# Patient Record
Sex: MALE | Race: WHITE
[De-identification: names, ages, dates, MRNs, and addresses within clinical notes are randomized per-mention and may not be internally consistent; named-entity substitution may affect disease eponyms.]

---

## 2018-05-12 ENCOUNTER — HOSPITAL ENCOUNTER (EMERGENCY)
Dept: HOSPITAL 94 - ER | Age: 58
Discharge: HOME | End: 2018-05-12
Payer: COMMERCIAL

## 2018-05-12 VITALS — HEIGHT: 65 IN | BODY MASS INDEX: 24.61 KG/M2 | WEIGHT: 147.71 LBS

## 2018-05-12 VITALS — SYSTOLIC BLOOD PRESSURE: 147 MMHG | DIASTOLIC BLOOD PRESSURE: 78 MMHG

## 2018-05-12 DIAGNOSIS — I13.0: Primary | ICD-10-CM

## 2018-05-12 DIAGNOSIS — Z79.899: ICD-10-CM

## 2018-05-12 DIAGNOSIS — E87.5: ICD-10-CM

## 2018-05-12 DIAGNOSIS — N18.9: ICD-10-CM

## 2018-05-12 DIAGNOSIS — I50.9: ICD-10-CM

## 2018-05-12 DIAGNOSIS — E78.00: ICD-10-CM

## 2018-05-12 DIAGNOSIS — Z90.49: ICD-10-CM

## 2018-05-12 DIAGNOSIS — Z87.891: ICD-10-CM

## 2018-05-12 DIAGNOSIS — E03.9: ICD-10-CM

## 2018-05-12 DIAGNOSIS — F15.10: ICD-10-CM

## 2018-05-12 DIAGNOSIS — G89.29: ICD-10-CM

## 2018-05-12 DIAGNOSIS — E11.22: ICD-10-CM

## 2018-05-12 DIAGNOSIS — F12.10: ICD-10-CM

## 2018-05-12 DIAGNOSIS — K21.9: ICD-10-CM

## 2018-05-12 DIAGNOSIS — Z79.82: ICD-10-CM

## 2018-05-12 LAB
ALBUMIN SERPL BCP-MCNC: 2.5 G/DL (ref 3.4–5)
ALBUMIN/GLOB SERPL: 0.7 {RATIO} (ref 1.1–1.5)
ALP SERPL-CCNC: 115 IU/L (ref 46–116)
ALT SERPL W P-5'-P-CCNC: 38 U/L (ref 12–78)
ANION GAP SERPL CALCULATED.3IONS-SCNC: 8 MMOL/L (ref 8–16)
AST SERPL W P-5'-P-CCNC: 27 U/L (ref 10–37)
BASOPHILS # BLD AUTO: 0 X10'3 (ref 0–0.2)
BASOPHILS NFR BLD AUTO: 0.6 % (ref 0–1)
BILIRUB SERPL-MCNC: 0.1 MG/DL (ref 0.1–1)
BUN SERPL-MCNC: 89 MG/DL (ref 7–18)
BUN/CREAT SERPL: 30.8 (ref 5.4–32)
CALCIUM SERPL-MCNC: 8 MG/DL (ref 8.5–10.1)
CHLORIDE SERPL-SCNC: 108 MMOL/L (ref 99–107)
CO2 SERPL-SCNC: 21.9 MMOL/L (ref 24–32)
CREAT SERPL-MCNC: 2.89 MG/DL (ref 0.6–1.1)
EOSINOPHIL # BLD AUTO: 0.2 X10'3 (ref 0–0.9)
EOSINOPHIL NFR BLD AUTO: 2.5 % (ref 0–6)
ERYTHROCYTE [DISTWIDTH] IN BLOOD BY AUTOMATED COUNT: 14.4 % (ref 11.5–14.5)
GFR SERPL CREATININE-BSD FRML MDRD: 23 ML/MIN
GLUCOSE SERPL-MCNC: 132 MG/DL (ref 70–104)
HCT VFR BLD AUTO: 26.5 % (ref 42–52)
HGB BLD-MCNC: 8.7 G/DL (ref 14–17.9)
LYMPHOCYTES # BLD AUTO: 1.2 X10'3 (ref 1.1–4.8)
LYMPHOCYTES NFR BLD AUTO: 19.2 % (ref 21–51)
MCH RBC QN AUTO: 30.5 PG (ref 27–31)
MCHC RBC AUTO-ENTMCNC: 32.8 % (ref 33–36.5)
MCV RBC AUTO: 92.9 FL (ref 78–98)
MONOCYTES # BLD AUTO: 0.5 X10'3 (ref 0–0.9)
MONOCYTES NFR BLD AUTO: 8.2 % (ref 2–12)
NEUTROPHILS # BLD AUTO: 4.2 X10'3 (ref 1.8–7.7)
NEUTROPHILS NFR BLD AUTO: 69.5 % (ref 42–75)
PLATELET # BLD AUTO: 241 X10'3 (ref 140–440)
PMV BLD AUTO: 7.1 FL (ref 7.4–10.4)
POTASSIUM SERPL-SCNC: 5.7 MMOL/L (ref 3.5–5.1)
PROT SERPL-MCNC: 6.1 G/DL (ref 6.4–8.2)
RBC # BLD AUTO: 2.86 X10'6 (ref 4.7–6.1)
SODIUM SERPL-SCNC: 138 MMOL/L (ref 135–145)
WBC # BLD AUTO: 6.1 X10'3 (ref 4.5–11)

## 2018-05-12 PROCEDURE — 99285 EMERGENCY DEPT VISIT HI MDM: CPT

## 2018-05-12 PROCEDURE — 85025 COMPLETE CBC W/AUTO DIFF WBC: CPT

## 2018-05-12 PROCEDURE — 80053 COMPREHEN METABOLIC PANEL: CPT

## 2018-05-12 PROCEDURE — 93005 ELECTROCARDIOGRAM TRACING: CPT

## 2018-05-12 PROCEDURE — 36415 COLL VENOUS BLD VENIPUNCTURE: CPT

## 2018-07-10 ENCOUNTER — HOSPITAL ENCOUNTER (EMERGENCY)
Dept: HOSPITAL 94 - ER | Age: 58
LOS: 1 days | Discharge: HOME | End: 2018-07-11
Payer: COMMERCIAL

## 2018-07-10 VITALS — WEIGHT: 145.51 LBS | HEIGHT: 66 IN | BODY MASS INDEX: 23.38 KG/M2

## 2018-07-10 DIAGNOSIS — F12.90: ICD-10-CM

## 2018-07-10 DIAGNOSIS — E87.5: Primary | ICD-10-CM

## 2018-07-10 DIAGNOSIS — E11.42: ICD-10-CM

## 2018-07-10 DIAGNOSIS — E78.00: ICD-10-CM

## 2018-07-10 DIAGNOSIS — I13.0: ICD-10-CM

## 2018-07-10 DIAGNOSIS — N18.9: ICD-10-CM

## 2018-07-10 DIAGNOSIS — Z79.82: ICD-10-CM

## 2018-07-10 DIAGNOSIS — I50.9: ICD-10-CM

## 2018-07-10 DIAGNOSIS — E03.9: ICD-10-CM

## 2018-07-10 DIAGNOSIS — F15.90: ICD-10-CM

## 2018-07-10 DIAGNOSIS — Z79.899: ICD-10-CM

## 2018-07-10 DIAGNOSIS — E11.22: ICD-10-CM

## 2018-07-10 DIAGNOSIS — Z90.49: ICD-10-CM

## 2018-07-10 DIAGNOSIS — K21.9: ICD-10-CM

## 2018-07-10 PROCEDURE — 85025 COMPLETE CBC W/AUTO DIFF WBC: CPT

## 2018-07-10 PROCEDURE — 85610 PROTHROMBIN TIME: CPT

## 2018-07-10 PROCEDURE — 36415 COLL VENOUS BLD VENIPUNCTURE: CPT

## 2018-07-10 PROCEDURE — 80053 COMPREHEN METABOLIC PANEL: CPT

## 2018-07-10 PROCEDURE — 85730 THROMBOPLASTIN TIME PARTIAL: CPT

## 2018-07-10 PROCEDURE — 99284 EMERGENCY DEPT VISIT MOD MDM: CPT

## 2018-07-10 PROCEDURE — 83735 ASSAY OF MAGNESIUM: CPT

## 2018-07-10 PROCEDURE — 84100 ASSAY OF PHOSPHORUS: CPT

## 2018-07-10 PROCEDURE — 80048 BASIC METABOLIC PNL TOTAL CA: CPT

## 2018-07-11 VITALS — DIASTOLIC BLOOD PRESSURE: 90 MMHG | SYSTOLIC BLOOD PRESSURE: 188 MMHG

## 2018-07-11 LAB
ALBUMIN SERPL BCP-MCNC: 3.2 G/DL (ref 3.4–5)
ALBUMIN SERPL BCP-MCNC: 3.2 G/DL (ref 3.4–5)
ALBUMIN/GLOB SERPL: 0.8 {RATIO} (ref 1.1–1.5)
ALP SERPL-CCNC: 138 IU/L (ref 46–116)
ALT SERPL W P-5'-P-CCNC: 57 U/L (ref 12–78)
ANION GAP SERPL CALCULATED.3IONS-SCNC: 11 MMOL/L (ref 8–16)
ANION GAP SERPL CALCULATED.3IONS-SCNC: 14 MMOL/L (ref 8–16)
APTT PPP: 23 SECONDS (ref 22–32)
AST SERPL W P-5'-P-CCNC: 30 U/L (ref 10–37)
BASOPHILS # BLD AUTO: 0 X10'3 (ref 0–0.2)
BASOPHILS NFR BLD AUTO: 0.5 % (ref 0–1)
BILIRUB SERPL-MCNC: 0.1 MG/DL (ref 0.1–1)
BUN SERPL-MCNC: 92 MG/DL (ref 7–18)
BUN SERPL-MCNC: 96 MG/DL (ref 7–18)
BUN/CREAT SERPL: 28.2 (ref 5.4–32)
BUN/CREAT SERPL: 29.4 (ref 5.4–32)
CALCIUM SERPL-MCNC: 8.4 MG/DL (ref 8.5–10.1)
CALCIUM SERPL-MCNC: 8.4 MG/DL (ref 8.5–10.1)
CHLORIDE SERPL-SCNC: 106 MMOL/L (ref 99–107)
CHLORIDE SERPL-SCNC: 106 MMOL/L (ref 99–107)
CO2 SERPL-SCNC: 18.5 MMOL/L (ref 24–32)
CO2 SERPL-SCNC: 20 MMOL/L (ref 24–32)
CREAT SERPL-MCNC: 3.13 MG/DL (ref 0.6–1.1)
CREAT SERPL-MCNC: 3.4 MG/DL (ref 0.6–1.1)
EOSINOPHIL # BLD AUTO: 0.2 X10'3 (ref 0–0.9)
EOSINOPHIL NFR BLD AUTO: 2.7 % (ref 0–6)
ERYTHROCYTE [DISTWIDTH] IN BLOOD BY AUTOMATED COUNT: 13.3 % (ref 11.5–14.5)
GFR SERPL CREATININE-BSD FRML MDRD: 19 ML/MIN
GFR SERPL CREATININE-BSD FRML MDRD: 21 ML/MIN
GLUCOSE SERPL-MCNC: 119 MG/DL (ref 70–104)
GLUCOSE SERPL-MCNC: 138 MG/DL (ref 70–104)
HCT VFR BLD AUTO: 25.9 % (ref 42–52)
HGB BLD-MCNC: 8.7 G/DL (ref 14–17.9)
INR PPP: 0.9 INR
LYMPHOCYTES # BLD AUTO: 1.3 X10'3 (ref 1.1–4.8)
LYMPHOCYTES NFR BLD AUTO: 16 % (ref 21–51)
MAGNESIUM SERPL-MCNC: 2.9 MG/DL (ref 1.5–2.4)
MCH RBC QN AUTO: 30.9 PG (ref 27–31)
MCHC RBC AUTO-ENTMCNC: 33.7 % (ref 33–36.5)
MCV RBC AUTO: 91.7 FL (ref 78–98)
MONOCYTES # BLD AUTO: 0.7 X10'3 (ref 0–0.9)
MONOCYTES NFR BLD AUTO: 8.9 % (ref 2–12)
NEUTROPHILS # BLD AUTO: 5.6 X10'3 (ref 1.8–7.7)
NEUTROPHILS NFR BLD AUTO: 71.9 % (ref 42–75)
PHOSPHATE SERPL-MCNC: 7 MG/DL (ref 2.3–4.5)
PLATELET # BLD AUTO: 228 X10'3 (ref 140–440)
PMV BLD AUTO: 7.2 FL (ref 7.4–10.4)
POTASSIUM SERPL-SCNC: 5.3 MMOL/L (ref 3.5–5.1)
POTASSIUM SERPL-SCNC: 5.6 MMOL/L (ref 3.5–5.1)
PROT SERPL-MCNC: 7 G/DL (ref 6.4–8.2)
PROTHROMBIN TIME: 9.3 SECONDS (ref 9–12)
RBC # BLD AUTO: 2.83 X10'6 (ref 4.7–6.1)
SODIUM SERPL-SCNC: 137 MMOL/L (ref 135–145)
SODIUM SERPL-SCNC: 138 MMOL/L (ref 135–145)
WBC # BLD AUTO: 7.8 X10'3 (ref 4.5–11)

## 2018-10-11 ENCOUNTER — HOSPITAL ENCOUNTER (INPATIENT)
Dept: HOSPITAL 94 - ER | Age: 58
LOS: 6 days | Discharge: HOME | DRG: 673 | End: 2018-10-17
Attending: INTERNAL MEDICINE | Admitting: FAMILY MEDICINE
Payer: COMMERCIAL

## 2018-10-11 VITALS — DIASTOLIC BLOOD PRESSURE: 48 MMHG | SYSTOLIC BLOOD PRESSURE: 149 MMHG

## 2018-10-11 VITALS — BODY MASS INDEX: 24.77 KG/M2 | WEIGHT: 154.1 LBS | HEIGHT: 66 IN

## 2018-10-11 VITALS — SYSTOLIC BLOOD PRESSURE: 161 MMHG | DIASTOLIC BLOOD PRESSURE: 67 MMHG

## 2018-10-11 VITALS — DIASTOLIC BLOOD PRESSURE: 67 MMHG | SYSTOLIC BLOOD PRESSURE: 158 MMHG

## 2018-10-11 DIAGNOSIS — F12.90: ICD-10-CM

## 2018-10-11 DIAGNOSIS — E78.5: ICD-10-CM

## 2018-10-11 DIAGNOSIS — E11.43: ICD-10-CM

## 2018-10-11 DIAGNOSIS — E87.5: ICD-10-CM

## 2018-10-11 DIAGNOSIS — E11.22: ICD-10-CM

## 2018-10-11 DIAGNOSIS — E43: ICD-10-CM

## 2018-10-11 DIAGNOSIS — Z82.3: ICD-10-CM

## 2018-10-11 DIAGNOSIS — D63.1: ICD-10-CM

## 2018-10-11 DIAGNOSIS — Z79.4: ICD-10-CM

## 2018-10-11 DIAGNOSIS — G89.29: ICD-10-CM

## 2018-10-11 DIAGNOSIS — F32.9: ICD-10-CM

## 2018-10-11 DIAGNOSIS — Z83.3: ICD-10-CM

## 2018-10-11 DIAGNOSIS — E87.2: ICD-10-CM

## 2018-10-11 DIAGNOSIS — K31.84: ICD-10-CM

## 2018-10-11 DIAGNOSIS — E11.00: ICD-10-CM

## 2018-10-11 DIAGNOSIS — I13.2: ICD-10-CM

## 2018-10-11 DIAGNOSIS — Z79.82: ICD-10-CM

## 2018-10-11 DIAGNOSIS — E03.9: ICD-10-CM

## 2018-10-11 DIAGNOSIS — E87.0: ICD-10-CM

## 2018-10-11 DIAGNOSIS — Z82.49: ICD-10-CM

## 2018-10-11 DIAGNOSIS — Z79.899: ICD-10-CM

## 2018-10-11 DIAGNOSIS — N17.9: Primary | ICD-10-CM

## 2018-10-11 DIAGNOSIS — Z90.49: ICD-10-CM

## 2018-10-11 DIAGNOSIS — K21.9: ICD-10-CM

## 2018-10-11 DIAGNOSIS — I48.91: ICD-10-CM

## 2018-10-11 DIAGNOSIS — D72.829: ICD-10-CM

## 2018-10-11 DIAGNOSIS — E11.319: ICD-10-CM

## 2018-10-11 DIAGNOSIS — N18.6: ICD-10-CM

## 2018-10-11 DIAGNOSIS — Z23: ICD-10-CM

## 2018-10-11 DIAGNOSIS — E78.00: ICD-10-CM

## 2018-10-11 DIAGNOSIS — I50.9: ICD-10-CM

## 2018-10-11 LAB
ALBUMIN SERPL BCP-MCNC: 3.4 G/DL (ref 3.4–5)
ALBUMIN SERPL BCP-MCNC: 3.5 G/DL (ref 3.4–5)
ALBUMIN SERPL BCP-MCNC: 3.7 G/DL (ref 3.4–5)
ALBUMIN/GLOB SERPL: 0.9 {RATIO} (ref 1.1–1.5)
ALP SERPL-CCNC: 83 IU/L (ref 46–116)
ALT SERPL W P-5'-P-CCNC: 36 U/L (ref 12–78)
AMORPH URATE CRY #/AREA URNS HPF: (no result) /[HPF]
ANION GAP SERPL CALCULATED.3IONS-SCNC: 22 MMOL/L (ref 8–16)
ANION GAP SERPL CALCULATED.3IONS-SCNC: 24 MMOL/L (ref 8–16)
ANION GAP SERPL CALCULATED.3IONS-SCNC: 24 MMOL/L (ref 8–16)
AST SERPL W P-5'-P-CCNC: 25 U/L (ref 10–37)
BACTERIA URNS QL MICRO: (no result) /HPF
BASOPHILS # BLD AUTO: 0 X10'3 (ref 0–0.2)
BASOPHILS NFR BLD AUTO: 0.2 % (ref 0–1)
BILIRUB SERPL-MCNC: 0.3 MG/DL (ref 0.1–1)
BUN SERPL-MCNC: 104 MG/DL (ref 7–18)
BUN SERPL-MCNC: 110 MG/DL (ref 7–18)
BUN SERPL-MCNC: 99 MG/DL (ref 7–18)
BUN/CREAT SERPL: 22.3 (ref 5.4–32)
BUN/CREAT SERPL: 23 (ref 5.4–32)
BUN/CREAT SERPL: 23.4 (ref 5.4–32)
CALCIUM SERPL-MCNC: 8.6 MG/DL (ref 8.5–10.1)
CALCIUM SERPL-MCNC: 8.8 MG/DL (ref 8.5–10.1)
CALCIUM SERPL-MCNC: 9 MG/DL (ref 8.5–10.1)
CHLORIDE SERPL-SCNC: 107 MMOL/L (ref 99–107)
CHLORIDE SERPL-SCNC: 110 MMOL/L (ref 99–107)
CHLORIDE SERPL-SCNC: 112 MMOL/L (ref 99–107)
CLARITY UR: (no result)
CO2 SERPL-SCNC: 10.1 MMOL/L (ref 24–32)
CO2 SERPL-SCNC: 11.1 MMOL/L (ref 24–32)
CO2 SERPL-SCNC: 13.4 MMOL/L (ref 24–32)
COLOR UR: YELLOW
CREAT SERPL-MCNC: 4.43 MG/DL (ref 0.6–1.1)
CREAT SERPL-MCNC: 4.44 MG/DL (ref 0.6–1.1)
CREAT SERPL-MCNC: 4.78 MG/DL (ref 0.6–1.1)
DEPRECATED SQUAMOUS URNS QL MICRO: (no result) /LPF
EOSINOPHIL # BLD AUTO: 0.1 X10'3 (ref 0–0.9)
EOSINOPHIL NFR BLD AUTO: 0.5 % (ref 0–6)
ERYTHROCYTE [DISTWIDTH] IN BLOOD BY AUTOMATED COUNT: 13.2 % (ref 11.5–14.5)
GFR SERPL CREATININE-BSD FRML MDRD: 13 ML/MIN
GFR SERPL CREATININE-BSD FRML MDRD: 14 ML/MIN
GFR SERPL CREATININE-BSD FRML MDRD: 14 ML/MIN
GLUCOSE SERPL-MCNC: 252 MG/DL (ref 70–104)
GLUCOSE SERPL-MCNC: 266 MG/DL (ref 70–104)
GLUCOSE SERPL-MCNC: 299 MG/DL (ref 70–104)
GLUCOSE UR STRIP-MCNC: 250 MG/DL
HCT VFR BLD AUTO: 26.8 % (ref 42–52)
HGB BLD-MCNC: 9.1 G/DL (ref 14–17.9)
HGB UR QL STRIP: (no result)
INR PPP: 1 INR
KETONES UR STRIP-MCNC: NEGATIVE MG/DL
LEUKOCYTE ESTERASE UR QL STRIP: NEGATIVE
LIPASE SERPL-CCNC: 404 U/L (ref 73–393)
LYMPHOCYTES # BLD AUTO: 1.5 X10'3 (ref 1.1–4.8)
LYMPHOCYTES NFR BLD AUTO: 9 % (ref 21–51)
MCH RBC QN AUTO: 30.7 PG (ref 27–31)
MCHC RBC AUTO-ENTMCNC: 33.7 % (ref 33–36.5)
MCV RBC AUTO: 91.1 FL (ref 78–98)
MONOCYTES # BLD AUTO: 0.5 X10'3 (ref 0–0.9)
MONOCYTES NFR BLD AUTO: 3.1 % (ref 2–12)
NEUTROPHILS # BLD AUTO: 14.2 X10'3 (ref 1.8–7.7)
NEUTROPHILS NFR BLD AUTO: 87.2 % (ref 42–75)
NITRITE UR QL STRIP: NEGATIVE
PH UR STRIP: 5.5 [PH] (ref 4.8–8)
PHOSPHATE SERPL-MCNC: 7 MG/DL (ref 2.3–4.5)
PHOSPHATE SERPL-MCNC: 7.3 MG/DL (ref 2.3–4.5)
PLATELET # BLD AUTO: 277 X10'3 (ref 140–440)
PMV BLD AUTO: 7.3 FL (ref 7.4–10.4)
POTASSIUM SERPL-SCNC: 4.4 MMOL/L (ref 3.5–5.1)
POTASSIUM SERPL-SCNC: 4.7 MMOL/L (ref 3.5–5.1)
POTASSIUM SERPL-SCNC: 4.9 MMOL/L (ref 3.5–5.1)
PROT SERPL-MCNC: 7.7 G/DL (ref 6.4–8.2)
PROT UR QL STRIP: 100 MG/DL
PROTHROMBIN TIME: 9.9 SECONDS (ref 9–12)
RBC # BLD AUTO: 2.95 X10'6 (ref 4.7–6.1)
RBC #/AREA URNS HPF: (no result) /HPF (ref 0–2)
SODIUM SERPL-SCNC: 142 MMOL/L (ref 135–145)
SODIUM SERPL-SCNC: 144 MMOL/L (ref 135–145)
SODIUM SERPL-SCNC: 147 MMOL/L (ref 135–145)
SP GR UR STRIP: 1.02 (ref 1–1.03)
URN COLLECT METHOD CLASS: (no result)
UROBILINOGEN UR STRIP-MCNC: 0.2 E.U/DL (ref 0.2–1)
WBC # BLD AUTO: 16.3 X10'3 (ref 4.5–11)
WBC #/AREA URNS HPF: (no result) /HPF (ref 0–4)

## 2018-10-11 PROCEDURE — 97530 THERAPEUTIC ACTIVITIES: CPT

## 2018-10-11 PROCEDURE — 96375 TX/PRO/DX INJ NEW DRUG ADDON: CPT

## 2018-10-11 PROCEDURE — 83036 HEMOGLOBIN GLYCOSYLATED A1C: CPT

## 2018-10-11 PROCEDURE — 85730 THROMBOPLASTIN TIME PARTIAL: CPT

## 2018-10-11 PROCEDURE — 82728 ASSAY OF FERRITIN: CPT

## 2018-10-11 PROCEDURE — 85610 PROTHROMBIN TIME: CPT

## 2018-10-11 PROCEDURE — 93005 ELECTROCARDIOGRAM TRACING: CPT

## 2018-10-11 PROCEDURE — 82948 REAGENT STRIP/BLOOD GLUCOSE: CPT

## 2018-10-11 PROCEDURE — 83690 ASSAY OF LIPASE: CPT

## 2018-10-11 PROCEDURE — 83540 ASSAY OF IRON: CPT

## 2018-10-11 PROCEDURE — 36558 INSERT TUNNELED CV CATH: CPT

## 2018-10-11 PROCEDURE — 85025 COMPLETE CBC W/AUTO DIFF WBC: CPT

## 2018-10-11 PROCEDURE — 96376 TX/PRO/DX INJ SAME DRUG ADON: CPT

## 2018-10-11 PROCEDURE — 84443 ASSAY THYROID STIM HORMONE: CPT

## 2018-10-11 PROCEDURE — 97161 PT EVAL LOW COMPLEX 20 MIN: CPT

## 2018-10-11 PROCEDURE — 80053 COMPREHEN METABOLIC PANEL: CPT

## 2018-10-11 PROCEDURE — 97116 GAIT TRAINING THERAPY: CPT

## 2018-10-11 PROCEDURE — 93306 TTE W/DOPPLER COMPLETE: CPT

## 2018-10-11 PROCEDURE — 87070 CULTURE OTHR SPECIMN AEROBIC: CPT

## 2018-10-11 PROCEDURE — 83735 ASSAY OF MAGNESIUM: CPT

## 2018-10-11 PROCEDURE — 71045 X-RAY EXAM CHEST 1 VIEW: CPT

## 2018-10-11 PROCEDURE — 96374 THER/PROPH/DIAG INJ IV PUSH: CPT

## 2018-10-11 PROCEDURE — 80048 BASIC METABOLIC PNL TOTAL CA: CPT

## 2018-10-11 PROCEDURE — 76937 US GUIDE VASCULAR ACCESS: CPT

## 2018-10-11 PROCEDURE — 36415 COLL VENOUS BLD VENIPUNCTURE: CPT

## 2018-10-11 PROCEDURE — 87340 HEPATITIS B SURFACE AG IA: CPT

## 2018-10-11 PROCEDURE — 77001 FLUOROGUIDE FOR VEIN DEVICE: CPT

## 2018-10-11 PROCEDURE — 87040 BLOOD CULTURE FOR BACTERIA: CPT

## 2018-10-11 PROCEDURE — 87088 URINE BACTERIA CULTURE: CPT

## 2018-10-11 PROCEDURE — 84100 ASSAY OF PHOSPHORUS: CPT

## 2018-10-11 PROCEDURE — 81001 URINALYSIS AUTO W/SCOPE: CPT

## 2018-10-11 PROCEDURE — 99285 EMERGENCY DEPT VISIT HI MDM: CPT

## 2018-10-11 PROCEDURE — 80202 ASSAY OF VANCOMYCIN: CPT

## 2018-10-11 PROCEDURE — 74176 CT ABD & PELVIS W/O CONTRAST: CPT

## 2018-10-11 PROCEDURE — 96361 HYDRATE IV INFUSION ADD-ON: CPT

## 2018-10-11 PROCEDURE — 83550 IRON BINDING TEST: CPT

## 2018-10-11 PROCEDURE — 83605 ASSAY OF LACTIC ACID: CPT

## 2018-10-11 RX ADMIN — METOCLOPRAMIDE PRN MG: 5 INJECTION, SOLUTION INTRAMUSCULAR; INTRAVENOUS at 19:25

## 2018-10-11 RX ADMIN — ONDANSETRON PRN MG: 2 INJECTION, SOLUTION INTRAMUSCULAR; INTRAVENOUS at 18:45

## 2018-10-11 RX ADMIN — SODIUM CHLORIDE SCH MLS/HR: 9 INJECTION INTRAMUSCULAR; INTRAVENOUS; SUBCUTANEOUS at 15:42

## 2018-10-11 RX ADMIN — SODIUM CHLORIDE SCH MLS/HR: 9 INJECTION INTRAMUSCULAR; INTRAVENOUS; SUBCUTANEOUS at 22:17

## 2018-10-11 RX ADMIN — SODIUM CHLORIDE SCH MLS/HR: 9 INJECTION INTRAMUSCULAR; INTRAVENOUS; SUBCUTANEOUS at 22:40

## 2018-10-12 VITALS — DIASTOLIC BLOOD PRESSURE: 61 MMHG | SYSTOLIC BLOOD PRESSURE: 117 MMHG

## 2018-10-12 VITALS — SYSTOLIC BLOOD PRESSURE: 173 MMHG | DIASTOLIC BLOOD PRESSURE: 74 MMHG

## 2018-10-12 VITALS — SYSTOLIC BLOOD PRESSURE: 157 MMHG | DIASTOLIC BLOOD PRESSURE: 49 MMHG

## 2018-10-12 VITALS — SYSTOLIC BLOOD PRESSURE: 170 MMHG | DIASTOLIC BLOOD PRESSURE: 106 MMHG

## 2018-10-12 VITALS — SYSTOLIC BLOOD PRESSURE: 167 MMHG | DIASTOLIC BLOOD PRESSURE: 67 MMHG

## 2018-10-12 VITALS — SYSTOLIC BLOOD PRESSURE: 145 MMHG | DIASTOLIC BLOOD PRESSURE: 53 MMHG

## 2018-10-12 LAB
ALBUMIN SERPL BCP-MCNC: 2.4 G/DL (ref 3.4–5)
ALBUMIN SERPL BCP-MCNC: 3.1 G/DL (ref 3.4–5)
ALBUMIN SERPL BCP-MCNC: 3.4 G/DL (ref 3.4–5)
ANION GAP SERPL CALCULATED.3IONS-SCNC: 19 MMOL/L (ref 8–16)
ANION GAP SERPL CALCULATED.3IONS-SCNC: 22 MMOL/L (ref 8–16)
ANION GAP SERPL CALCULATED.3IONS-SCNC: 23 MMOL/L (ref 8–16)
BASOPHILS # BLD AUTO: 0 X10'3 (ref 0–0.2)
BASOPHILS NFR BLD AUTO: 0 % (ref 0–1)
BUN SERPL-MCNC: 74 MG/DL (ref 7–18)
BUN SERPL-MCNC: 85 MG/DL (ref 7–18)
BUN SERPL-MCNC: 92 MG/DL (ref 7–18)
BUN/CREAT SERPL: 20.6 (ref 5.4–32)
BUN/CREAT SERPL: 22.8 (ref 5.4–32)
BUN/CREAT SERPL: 23.7 (ref 5.4–32)
CALCIUM SERPL-MCNC: 6.2 MG/DL (ref 8.5–10.1)
CALCIUM SERPL-MCNC: 8.6 MG/DL (ref 8.5–10.1)
CALCIUM SERPL-MCNC: 8.6 MG/DL (ref 8.5–10.1)
CHLORIDE SERPL-SCNC: 117 MMOL/L (ref 99–107)
CHLORIDE SERPL-SCNC: 121 MMOL/L (ref 99–107)
CHLORIDE SERPL-SCNC: 123 MMOL/L (ref 99–107)
CO2 SERPL-SCNC: 10.5 MMOL/L (ref 24–32)
CO2 SERPL-SCNC: 11 MMOL/L (ref 24–32)
CO2 SERPL-SCNC: 11 MMOL/L (ref 24–32)
CREAT SERPL-MCNC: 3.12 MG/DL (ref 0.6–1.1)
CREAT SERPL-MCNC: 4.04 MG/DL (ref 0.6–1.1)
CREAT SERPL-MCNC: 4.13 MG/DL (ref 0.6–1.1)
EOSINOPHIL # BLD AUTO: 0 X10'3 (ref 0–0.9)
EOSINOPHIL NFR BLD AUTO: 0 % (ref 0–6)
ERYTHROCYTE [DISTWIDTH] IN BLOOD BY AUTOMATED COUNT: 12.9 % (ref 11.5–14.5)
GFR SERPL CREATININE-BSD FRML MDRD: 15 ML/MIN
GFR SERPL CREATININE-BSD FRML MDRD: 15 ML/MIN
GFR SERPL CREATININE-BSD FRML MDRD: 21 ML/MIN
GLUCOSE SERPL-MCNC: 117 MG/DL (ref 70–104)
GLUCOSE SERPL-MCNC: 121 MG/DL (ref 70–104)
GLUCOSE SERPL-MCNC: 204 MG/DL (ref 70–104)
HBA1C MFR BLD: 6 % (ref 4.5–6.2)
HCT VFR BLD AUTO: 24.3 % (ref 42–52)
HGB BLD-MCNC: 8.2 G/DL (ref 14–17.9)
LYMPHOCYTES # BLD AUTO: 0.9 X10'3 (ref 1.1–4.8)
LYMPHOCYTES NFR BLD AUTO: 3.6 % (ref 21–51)
LYMPHOCYTES NFR BLD MANUAL: 4 % (ref 21–51)
MCH RBC QN AUTO: 30.6 PG (ref 27–31)
MCHC RBC AUTO-ENTMCNC: 33.7 % (ref 33–36.5)
MCV RBC AUTO: 90.7 FL (ref 78–98)
MONOCYTES # BLD AUTO: 1.2 X10'3 (ref 0–0.9)
MONOCYTES NFR BLD AUTO: 4.7 % (ref 2–12)
MONOCYTES NFR BLD MANUAL: 4 % (ref 2–12)
NEUTROPHILS # BLD AUTO: 23.2 X10'3 (ref 1.8–7.7)
NEUTROPHILS NFR BLD AUTO: 91.7 % (ref 42–75)
NEUTS SEG NFR BLD MANUAL: 92 % (ref 42–75)
PHOSPHATE SERPL-MCNC: 4.7 MG/DL (ref 2.3–4.5)
PHOSPHATE SERPL-MCNC: 4.7 MG/DL (ref 2.3–4.5)
PLATELET # BLD AUTO: 250 X10'3 (ref 140–440)
PLATELET BLD QL SMEAR: NORMAL
PMV BLD AUTO: 7.5 FL (ref 7.4–10.4)
POTASSIUM SERPL-SCNC: 2.7 MMOL/L (ref 3.5–5.1)
POTASSIUM SERPL-SCNC: 4.3 MMOL/L (ref 3.5–5.1)
POTASSIUM SERPL-SCNC: 5.3 MMOL/L (ref 3.5–5.1)
RBC # BLD AUTO: 2.68 X10'6 (ref 4.7–6.1)
RBC MORPH BLD: NORMAL
SODIUM SERPL-SCNC: 151 MMOL/L (ref 135–145)
SODIUM SERPL-SCNC: 153 MMOL/L (ref 135–145)
SODIUM SERPL-SCNC: 153 MMOL/L (ref 135–145)
TOTAL CELLS COUNTED FLD: 100
WBC # BLD AUTO: 25.3 X10'3 (ref 4.5–11)

## 2018-10-12 PROCEDURE — B548ZZA ULTRASONOGRAPHY OF SUPERIOR VENA CAVA, GUIDANCE: ICD-10-PCS | Performed by: RADIOLOGY

## 2018-10-12 PROCEDURE — 02HV33Z INSERTION OF INFUSION DEVICE INTO SUPERIOR VENA CAVA, PERCUTANEOUS APPROACH: ICD-10-PCS | Performed by: RADIOLOGY

## 2018-10-12 PROCEDURE — 0JH63XZ INSERTION OF TUNNELED VASCULAR ACCESS DEVICE INTO CHEST SUBCUTANEOUS TISSUE AND FASCIA, PERCUTANEOUS APPROACH: ICD-10-PCS | Performed by: RADIOLOGY

## 2018-10-12 PROCEDURE — 5A1D70Z PERFORMANCE OF URINARY FILTRATION, INTERMITTENT, LESS THAN 6 HOURS PER DAY: ICD-10-PCS | Performed by: INTERNAL MEDICINE

## 2018-10-12 RX ADMIN — INSULIN GLARGINE SCH UNIT: 100 INJECTION, SOLUTION SUBCUTANEOUS at 21:20

## 2018-10-12 RX ADMIN — SODIUM CHLORIDE SCH MLS/HR: 9 INJECTION INTRAMUSCULAR; INTRAVENOUS; SUBCUTANEOUS at 06:17

## 2018-10-12 RX ADMIN — INSULIN LISPRO SCH UNITS: 100 INJECTION, SOLUTION INTRAVENOUS; SUBCUTANEOUS at 17:55

## 2018-10-12 RX ADMIN — SODIUM CHLORIDE SCH MLS/HR: 9 INJECTION INTRAMUSCULAR; INTRAVENOUS; SUBCUTANEOUS at 02:58

## 2018-10-12 RX ADMIN — TAZOBACTAM SODIUM AND PIPERACILLIN SODIUM SCH MLS/HR: 250; 2 INJECTION, SOLUTION INTRAVENOUS at 20:09

## 2018-10-12 RX ADMIN — DEXTROSE SCH MLS/HR: 5 SOLUTION INTRAVENOUS at 12:18

## 2018-10-12 RX ADMIN — ONDANSETRON PRN MG: 2 INJECTION, SOLUTION INTRAMUSCULAR; INTRAVENOUS at 02:05

## 2018-10-12 RX ADMIN — Medication PRN MLS/HR: at 07:24

## 2018-10-12 RX ADMIN — Medication PRN MLS/HR: at 03:04

## 2018-10-12 RX ADMIN — SODIUM CHLORIDE SCH MLS/HR: 9 INJECTION INTRAMUSCULAR; INTRAVENOUS; SUBCUTANEOUS at 08:05

## 2018-10-12 RX ADMIN — SODIUM CHLORIDE SCH MLS/HR: 9 INJECTION INTRAMUSCULAR; INTRAVENOUS; SUBCUTANEOUS at 08:35

## 2018-10-13 VITALS — DIASTOLIC BLOOD PRESSURE: 73 MMHG | SYSTOLIC BLOOD PRESSURE: 166 MMHG

## 2018-10-13 VITALS — SYSTOLIC BLOOD PRESSURE: 152 MMHG | DIASTOLIC BLOOD PRESSURE: 82 MMHG

## 2018-10-13 VITALS — SYSTOLIC BLOOD PRESSURE: 174 MMHG | DIASTOLIC BLOOD PRESSURE: 77 MMHG

## 2018-10-13 VITALS — DIASTOLIC BLOOD PRESSURE: 84 MMHG | SYSTOLIC BLOOD PRESSURE: 203 MMHG

## 2018-10-13 VITALS — SYSTOLIC BLOOD PRESSURE: 94 MMHG | DIASTOLIC BLOOD PRESSURE: 51 MMHG

## 2018-10-13 VITALS — DIASTOLIC BLOOD PRESSURE: 87 MMHG | SYSTOLIC BLOOD PRESSURE: 144 MMHG

## 2018-10-13 LAB
ALBUMIN SERPL BCP-MCNC: 3 G/DL (ref 3.4–5)
ALBUMIN/GLOB SERPL: 0.8 {RATIO} (ref 1.1–1.5)
ALP SERPL-CCNC: 74 IU/L (ref 46–116)
ALT SERPL W P-5'-P-CCNC: 43 U/L (ref 12–78)
ANION GAP SERPL CALCULATED.3IONS-SCNC: 14 MMOL/L (ref 8–16)
APTT PPP: 30 SECONDS (ref 22–32)
AST SERPL W P-5'-P-CCNC: 47 U/L (ref 10–37)
BASOPHILS # BLD AUTO: 0 X10'3 (ref 0–0.2)
BASOPHILS # BLD AUTO: 0 X10'3 (ref 0–0.2)
BASOPHILS NFR BLD AUTO: 0 % (ref 0–1)
BASOPHILS NFR BLD AUTO: 0.1 % (ref 0–1)
BILIRUB SERPL-MCNC: 0.6 MG/DL (ref 0.1–1)
BUN SERPL-MCNC: 45 MG/DL (ref 7–18)
BUN/CREAT SERPL: 14.2 (ref 5.4–32)
CALCIUM SERPL-MCNC: 8.8 MG/DL (ref 8.5–10.1)
CHLORIDE SERPL-SCNC: 112 MMOL/L (ref 99–107)
CO2 SERPL-SCNC: 26.9 MMOL/L (ref 24–32)
CREAT SERPL-MCNC: 3.17 MG/DL (ref 0.6–1.1)
EOSINOPHIL # BLD AUTO: 0 X10'3 (ref 0–0.9)
EOSINOPHIL # BLD AUTO: 0.2 X10'3 (ref 0–0.9)
EOSINOPHIL NFR BLD AUTO: 0.1 % (ref 0–6)
EOSINOPHIL NFR BLD AUTO: 1.1 % (ref 0–6)
ERYTHROCYTE [DISTWIDTH] IN BLOOD BY AUTOMATED COUNT: 12.9 % (ref 11.5–14.5)
ERYTHROCYTE [DISTWIDTH] IN BLOOD BY AUTOMATED COUNT: 13.9 % (ref 11.5–14.5)
GFR SERPL CREATININE-BSD FRML MDRD: 20 ML/MIN
GLUCOSE SERPL-MCNC: 128 MG/DL (ref 70–104)
HCT VFR BLD AUTO: 24.9 % (ref 42–52)
HCT VFR BLD AUTO: 25.8 % (ref 42–52)
HGB BLD-MCNC: 8.2 G/DL (ref 14–17.9)
HGB BLD-MCNC: 8.8 G/DL (ref 14–17.9)
INR PPP: 1 INR
LYMPHOCYTES # BLD AUTO: 1 X10'3 (ref 1.1–4.8)
LYMPHOCYTES # BLD AUTO: 1.2 X10'3 (ref 1.1–4.8)
LYMPHOCYTES NFR BLD AUTO: 4.6 % (ref 21–51)
LYMPHOCYTES NFR BLD AUTO: 6.9 % (ref 21–51)
MAGNESIUM SERPL-MCNC: 1.8 MG/DL (ref 1.5–2.4)
MCH RBC QN AUTO: 29.7 PG (ref 27–31)
MCH RBC QN AUTO: 30.8 PG (ref 27–31)
MCHC RBC AUTO-ENTMCNC: 32.8 % (ref 33–36.5)
MCHC RBC AUTO-ENTMCNC: 34.2 % (ref 33–36.5)
MCV RBC AUTO: 90 FL (ref 78–98)
MCV RBC AUTO: 90.6 FL (ref 78–98)
MONOCYTES # BLD AUTO: 1 X10'3 (ref 0–0.9)
MONOCYTES # BLD AUTO: 1 X10'3 (ref 0–0.9)
MONOCYTES NFR BLD AUTO: 4.9 % (ref 2–12)
MONOCYTES NFR BLD AUTO: 6 % (ref 2–12)
NEUTROPHILS # BLD AUTO: 15 X10'3 (ref 1.8–7.7)
NEUTROPHILS # BLD AUTO: 19.1 X10'3 (ref 1.8–7.7)
NEUTROPHILS NFR BLD AUTO: 86 % (ref 42–75)
NEUTROPHILS NFR BLD AUTO: 90.3 % (ref 42–75)
PHOSPHATE SERPL-MCNC: 3.7 MG/DL (ref 2.3–4.5)
PLATELET # BLD AUTO: 229 X10'3 (ref 140–440)
PLATELET # BLD AUTO: 231 X10'3 (ref 140–440)
PMV BLD AUTO: 6.5 FL (ref 7.4–10.4)
PMV BLD AUTO: 7.4 FL (ref 7.4–10.4)
POTASSIUM SERPL-SCNC: 3.7 MMOL/L (ref 3.5–5.1)
PROT SERPL-MCNC: 6.8 G/DL (ref 6.4–8.2)
PROTHROMBIN TIME: 10.4 SECONDS (ref 9–12)
RBC # BLD AUTO: 2.75 X10'6 (ref 4.7–6.1)
RBC # BLD AUTO: 2.87 X10'6 (ref 4.7–6.1)
SODIUM SERPL-SCNC: 153 MMOL/L (ref 135–145)
VANCOMYCIN SERPL-MCNC: 13.2 UG/ML
WBC # BLD AUTO: 17.4 X10'3 (ref 4.5–11)
WBC # BLD AUTO: 21.1 X10'3 (ref 4.5–11)

## 2018-10-13 PROCEDURE — 3E02340 INTRODUCTION OF INFLUENZA VACCINE INTO MUSCLE, PERCUTANEOUS APPROACH: ICD-10-PCS | Performed by: INTERNAL MEDICINE

## 2018-10-13 PROCEDURE — 5A1D70Z PERFORMANCE OF URINARY FILTRATION, INTERMITTENT, LESS THAN 6 HOURS PER DAY: ICD-10-PCS | Performed by: INTERNAL MEDICINE

## 2018-10-13 RX ADMIN — TAZOBACTAM SODIUM AND PIPERACILLIN SODIUM SCH MLS/HR: 250; 2 INJECTION, SOLUTION INTRAVENOUS at 16:00

## 2018-10-13 RX ADMIN — METOPROLOL TARTRATE PRN MG: 5 INJECTION INTRAVENOUS at 14:16

## 2018-10-13 RX ADMIN — METOCLOPRAMIDE PRN MG: 5 INJECTION, SOLUTION INTRAMUSCULAR; INTRAVENOUS at 07:36

## 2018-10-13 RX ADMIN — METOPROLOL TARTRATE PRN MG: 5 INJECTION INTRAVENOUS at 13:56

## 2018-10-13 RX ADMIN — METOPROLOL TARTRATE PRN MG: 5 INJECTION INTRAVENOUS at 14:20

## 2018-10-13 RX ADMIN — TAZOBACTAM SODIUM AND PIPERACILLIN SODIUM SCH MLS/HR: 250; 2 INJECTION, SOLUTION INTRAVENOUS at 20:51

## 2018-10-13 RX ADMIN — LEVOTHYROXINE SODIUM SCH MCG: 100 TABLET ORAL at 07:32

## 2018-10-13 RX ADMIN — TAZOBACTAM SODIUM AND PIPERACILLIN SODIUM SCH MLS/HR: 250; 2 INJECTION, SOLUTION INTRAVENOUS at 07:43

## 2018-10-13 RX ADMIN — DEXTROSE SCH MLS/HR: 5 SOLUTION INTRAVENOUS at 00:46

## 2018-10-13 RX ADMIN — Medication SCH MG: at 07:32

## 2018-10-13 RX ADMIN — Medication NR EACH: at 10:00

## 2018-10-13 RX ADMIN — TAZOBACTAM SODIUM AND PIPERACILLIN SODIUM SCH MLS/HR: 250; 2 INJECTION, SOLUTION INTRAVENOUS at 01:31

## 2018-10-13 RX ADMIN — HEPARIN SODIUM SCH UNIT: 5000 INJECTION, SOLUTION INTRAVENOUS; SUBCUTANEOUS at 20:50

## 2018-10-13 RX ADMIN — DEXTROSE SCH MLS/HR: 5 SOLUTION INTRAVENOUS at 21:10

## 2018-10-13 RX ADMIN — DEXTROSE SCH MLS/HR: 5 SOLUTION INTRAVENOUS at 13:55

## 2018-10-13 RX ADMIN — INSULIN GLARGINE SCH UNIT: 100 INJECTION, SOLUTION SUBCUTANEOUS at 21:11

## 2018-10-14 VITALS — SYSTOLIC BLOOD PRESSURE: 165 MMHG | DIASTOLIC BLOOD PRESSURE: 76 MMHG

## 2018-10-14 VITALS — DIASTOLIC BLOOD PRESSURE: 88 MMHG | SYSTOLIC BLOOD PRESSURE: 197 MMHG

## 2018-10-14 VITALS — SYSTOLIC BLOOD PRESSURE: 206 MMHG | DIASTOLIC BLOOD PRESSURE: 89 MMHG

## 2018-10-14 VITALS — SYSTOLIC BLOOD PRESSURE: 143 MMHG | DIASTOLIC BLOOD PRESSURE: 83 MMHG

## 2018-10-14 VITALS — SYSTOLIC BLOOD PRESSURE: 188 MMHG | DIASTOLIC BLOOD PRESSURE: 62 MMHG

## 2018-10-14 VITALS — SYSTOLIC BLOOD PRESSURE: 180 MMHG | DIASTOLIC BLOOD PRESSURE: 81 MMHG

## 2018-10-14 VITALS — SYSTOLIC BLOOD PRESSURE: 187 MMHG | DIASTOLIC BLOOD PRESSURE: 78 MMHG

## 2018-10-14 VITALS — DIASTOLIC BLOOD PRESSURE: 84 MMHG | SYSTOLIC BLOOD PRESSURE: 187 MMHG

## 2018-10-14 VITALS — DIASTOLIC BLOOD PRESSURE: 86 MMHG | SYSTOLIC BLOOD PRESSURE: 156 MMHG

## 2018-10-14 LAB
ALBUMIN SERPL BCP-MCNC: 2.8 G/DL (ref 3.4–5)
ALBUMIN/GLOB SERPL: 0.8 {RATIO} (ref 1.1–1.5)
ALP SERPL-CCNC: 68 IU/L (ref 46–116)
ALT SERPL W P-5'-P-CCNC: 42 U/L (ref 12–78)
ANION GAP SERPL CALCULATED.3IONS-SCNC: 12 MMOL/L (ref 8–16)
AST SERPL W P-5'-P-CCNC: 48 U/L (ref 10–37)
BASOPHILS # BLD AUTO: 0 X10'3 (ref 0–0.2)
BASOPHILS NFR BLD AUTO: 0 % (ref 0–1)
BILIRUB SERPL-MCNC: 0.8 MG/DL (ref 0.1–1)
BUN SERPL-MCNC: 35 MG/DL (ref 7–18)
BUN/CREAT SERPL: 11.6 (ref 5.4–32)
CALCIUM SERPL-MCNC: 8.1 MG/DL (ref 8.5–10.1)
CHLORIDE SERPL-SCNC: 104 MMOL/L (ref 99–107)
CO2 SERPL-SCNC: 31.4 MMOL/L (ref 24–32)
CREAT SERPL-MCNC: 3.03 MG/DL (ref 0.6–1.1)
EOSINOPHIL # BLD AUTO: 0.2 X10'3 (ref 0–0.9)
EOSINOPHIL NFR BLD AUTO: 1.5 % (ref 0–6)
ERYTHROCYTE [DISTWIDTH] IN BLOOD BY AUTOMATED COUNT: 13.7 % (ref 11.5–14.5)
GFR SERPL CREATININE-BSD FRML MDRD: 22 ML/MIN
GLUCOSE SERPL-MCNC: 143 MG/DL (ref 70–104)
HCT VFR BLD AUTO: 24.9 % (ref 42–52)
HGB BLD-MCNC: 8.2 G/DL (ref 14–17.9)
LYMPHOCYTES # BLD AUTO: 1.1 X10'3 (ref 1.1–4.8)
LYMPHOCYTES NFR BLD AUTO: 7.7 % (ref 21–51)
MAGNESIUM SERPL-MCNC: 1.6 MG/DL (ref 1.5–2.4)
MCH RBC QN AUTO: 29.7 PG (ref 27–31)
MCHC RBC AUTO-ENTMCNC: 33 % (ref 33–36.5)
MCV RBC AUTO: 90 FL (ref 78–98)
MONOCYTES # BLD AUTO: 1.1 X10'3 (ref 0–0.9)
MONOCYTES NFR BLD AUTO: 7.4 % (ref 2–12)
NEUTROPHILS # BLD AUTO: 12.4 X10'3 (ref 1.8–7.7)
NEUTROPHILS NFR BLD AUTO: 83.4 % (ref 42–75)
PLATELET # BLD AUTO: 227 X10'3 (ref 140–440)
PMV BLD AUTO: 7 FL (ref 7.4–10.4)
POTASSIUM SERPL-SCNC: 3.2 MMOL/L (ref 3.5–5.1)
PROT SERPL-MCNC: 6.3 G/DL (ref 6.4–8.2)
RBC # BLD AUTO: 2.77 X10'6 (ref 4.7–6.1)
SODIUM SERPL-SCNC: 147 MMOL/L (ref 135–145)
VANCOMYCIN SERPL-MCNC: 21.3 UG/ML
WBC # BLD AUTO: 14.9 X10'3 (ref 4.5–11)

## 2018-10-14 RX ADMIN — TAZOBACTAM SODIUM AND PIPERACILLIN SODIUM SCH MLS/HR: 250; 2 INJECTION, SOLUTION INTRAVENOUS at 02:02

## 2018-10-14 RX ADMIN — LEVOTHYROXINE SODIUM SCH MCG: 100 TABLET ORAL at 07:52

## 2018-10-14 RX ADMIN — TAZOBACTAM SODIUM AND PIPERACILLIN SODIUM SCH MLS/HR: 250; 2 INJECTION, SOLUTION INTRAVENOUS at 14:20

## 2018-10-14 RX ADMIN — HYDRALAZINE HYDROCHLORIDE PRN MG: 20 INJECTION INTRAMUSCULAR; INTRAVENOUS at 11:20

## 2018-10-14 RX ADMIN — TAZOBACTAM SODIUM AND PIPERACILLIN SODIUM SCH MLS/HR: 250; 2 INJECTION, SOLUTION INTRAVENOUS at 07:51

## 2018-10-14 RX ADMIN — TAZOBACTAM SODIUM AND PIPERACILLIN SODIUM SCH MLS/HR: 250; 2 INJECTION, SOLUTION INTRAVENOUS at 21:10

## 2018-10-14 RX ADMIN — INSULIN GLARGINE SCH UNIT: 100 INJECTION, SOLUTION SUBCUTANEOUS at 21:13

## 2018-10-14 RX ADMIN — HEPARIN SODIUM SCH UNIT: 5000 INJECTION, SOLUTION INTRAVENOUS; SUBCUTANEOUS at 07:53

## 2018-10-14 RX ADMIN — Medication NR EACH: at 10:00

## 2018-10-14 RX ADMIN — HEPARIN SODIUM SCH UNIT: 5000 INJECTION, SOLUTION INTRAVENOUS; SUBCUTANEOUS at 21:10

## 2018-10-14 RX ADMIN — Medication SCH MMU: at 21:10

## 2018-10-14 RX ADMIN — HYDRALAZINE HYDROCHLORIDE PRN MG: 20 INJECTION INTRAMUSCULAR; INTRAVENOUS at 17:37

## 2018-10-14 RX ADMIN — HYDRALAZINE HYDROCHLORIDE PRN MG: 20 INJECTION INTRAMUSCULAR; INTRAVENOUS at 03:35

## 2018-10-14 RX ADMIN — DEXTROSE SCH MLS/HR: 5 SOLUTION INTRAVENOUS at 05:23

## 2018-10-14 RX ADMIN — Medication SCH MG: at 07:51

## 2018-10-14 RX ADMIN — PANTOPRAZOLE SODIUM SCH MG: 40 TABLET, DELAYED RELEASE ORAL at 07:50

## 2018-10-14 RX ADMIN — INSULIN LISPRO SCH UNITS: 100 INJECTION, SOLUTION INTRAVENOUS; SUBCUTANEOUS at 19:00

## 2018-10-14 RX ADMIN — INSULIN LISPRO SCH UNITS: 100 INJECTION, SOLUTION INTRAVENOUS; SUBCUTANEOUS at 12:58

## 2018-10-15 VITALS — SYSTOLIC BLOOD PRESSURE: 200 MMHG | DIASTOLIC BLOOD PRESSURE: 88 MMHG

## 2018-10-15 VITALS — SYSTOLIC BLOOD PRESSURE: 158 MMHG | DIASTOLIC BLOOD PRESSURE: 87 MMHG

## 2018-10-15 VITALS — DIASTOLIC BLOOD PRESSURE: 80 MMHG | SYSTOLIC BLOOD PRESSURE: 165 MMHG

## 2018-10-15 VITALS — SYSTOLIC BLOOD PRESSURE: 104 MMHG | DIASTOLIC BLOOD PRESSURE: 38 MMHG

## 2018-10-15 VITALS — SYSTOLIC BLOOD PRESSURE: 163 MMHG | DIASTOLIC BLOOD PRESSURE: 80 MMHG

## 2018-10-15 VITALS — SYSTOLIC BLOOD PRESSURE: 144 MMHG | DIASTOLIC BLOOD PRESSURE: 115 MMHG

## 2018-10-15 VITALS — DIASTOLIC BLOOD PRESSURE: 85 MMHG | SYSTOLIC BLOOD PRESSURE: 204 MMHG

## 2018-10-15 VITALS — DIASTOLIC BLOOD PRESSURE: 87 MMHG | SYSTOLIC BLOOD PRESSURE: 186 MMHG

## 2018-10-15 VITALS — SYSTOLIC BLOOD PRESSURE: 94 MMHG | DIASTOLIC BLOOD PRESSURE: 74 MMHG

## 2018-10-15 LAB
ALBUMIN SERPL BCP-MCNC: 2.6 G/DL (ref 3.4–5)
ALBUMIN/GLOB SERPL: 0.8 {RATIO} (ref 1.1–1.5)
ALP SERPL-CCNC: 63 IU/L (ref 46–116)
ALT SERPL W P-5'-P-CCNC: 45 U/L (ref 12–78)
ANION GAP SERPL CALCULATED.3IONS-SCNC: 11 MMOL/L (ref 8–16)
AST SERPL W P-5'-P-CCNC: 39 U/L (ref 10–37)
BASOPHILS # BLD AUTO: 0 X10'3 (ref 0–0.2)
BASOPHILS NFR BLD AUTO: 0.2 % (ref 0–1)
BILIRUB SERPL-MCNC: 0.6 MG/DL (ref 0.1–1)
BUN SERPL-MCNC: 35 MG/DL (ref 7–18)
BUN/CREAT SERPL: 10.5 (ref 5.4–32)
CALCIUM SERPL-MCNC: 8.1 MG/DL (ref 8.5–10.1)
CHLORIDE SERPL-SCNC: 104 MMOL/L (ref 99–107)
CO2 SERPL-SCNC: 31.9 MMOL/L (ref 24–32)
CREAT SERPL-MCNC: 3.33 MG/DL (ref 0.6–1.1)
EOSINOPHIL # BLD AUTO: 0 X10'3 (ref 0–0.9)
EOSINOPHIL NFR BLD AUTO: 0.2 % (ref 0–6)
ERYTHROCYTE [DISTWIDTH] IN BLOOD BY AUTOMATED COUNT: 13.6 % (ref 11.5–14.5)
GFR SERPL CREATININE-BSD FRML MDRD: 19 ML/MIN
GLUCOSE SERPL-MCNC: 145 MG/DL (ref 70–104)
HCT VFR BLD AUTO: 25.5 % (ref 42–52)
HGB BLD-MCNC: 8.3 G/DL (ref 14–17.9)
LYMPHOCYTES # BLD AUTO: 0.8 X10'3 (ref 1.1–4.8)
LYMPHOCYTES NFR BLD AUTO: 8.1 % (ref 21–51)
MAGNESIUM SERPL-MCNC: 1.4 MG/DL (ref 1.5–2.4)
MCH RBC QN AUTO: 29.7 PG (ref 27–31)
MCHC RBC AUTO-ENTMCNC: 32.5 % (ref 33–36.5)
MCV RBC AUTO: 91.4 FL (ref 78–98)
MONOCYTES # BLD AUTO: 1 X10'3 (ref 0–0.9)
MONOCYTES NFR BLD AUTO: 10.5 % (ref 2–12)
NEUTROPHILS # BLD AUTO: 7.7 X10'3 (ref 1.8–7.7)
NEUTROPHILS NFR BLD AUTO: 81 % (ref 42–75)
PLATELET # BLD AUTO: 198 X10'3 (ref 140–440)
PMV BLD AUTO: 7 FL (ref 7.4–10.4)
POTASSIUM SERPL-SCNC: 3.1 MMOL/L (ref 3.5–5.1)
PROT SERPL-MCNC: 5.8 G/DL (ref 6.4–8.2)
RBC # BLD AUTO: 2.79 X10'6 (ref 4.7–6.1)
SODIUM SERPL-SCNC: 147 MMOL/L (ref 135–145)
VANCOMYCIN SERPL-MCNC: 13 UG/ML
WBC # BLD AUTO: 9.5 X10'3 (ref 4.5–11)

## 2018-10-15 PROCEDURE — 5A1D70Z PERFORMANCE OF URINARY FILTRATION, INTERMITTENT, LESS THAN 6 HOURS PER DAY: ICD-10-PCS | Performed by: INTERNAL MEDICINE

## 2018-10-15 RX ADMIN — HEPARIN SODIUM SCH UNIT: 5000 INJECTION, SOLUTION INTRAVENOUS; SUBCUTANEOUS at 20:18

## 2018-10-15 RX ADMIN — HYDRALAZINE HYDROCHLORIDE PRN MG: 20 INJECTION INTRAMUSCULAR; INTRAVENOUS at 17:58

## 2018-10-15 RX ADMIN — PANTOPRAZOLE SODIUM SCH MG: 40 TABLET, DELAYED RELEASE ORAL at 08:02

## 2018-10-15 RX ADMIN — HYDRALAZINE HYDROCHLORIDE PRN MG: 20 INJECTION INTRAMUSCULAR; INTRAVENOUS at 02:44

## 2018-10-15 RX ADMIN — LEVOTHYROXINE SODIUM SCH MCG: 100 TABLET ORAL at 08:02

## 2018-10-15 RX ADMIN — TAZOBACTAM SODIUM AND PIPERACILLIN SODIUM SCH MLS/HR: 250; 2 INJECTION, SOLUTION INTRAVENOUS at 08:01

## 2018-10-15 RX ADMIN — INSULIN LISPRO SCH UNITS: 100 INJECTION, SOLUTION INTRAVENOUS; SUBCUTANEOUS at 20:15

## 2018-10-15 RX ADMIN — TAZOBACTAM SODIUM AND PIPERACILLIN SODIUM SCH MLS/HR: 250; 2 INJECTION, SOLUTION INTRAVENOUS at 02:43

## 2018-10-15 RX ADMIN — TAZOBACTAM SODIUM AND PIPERACILLIN SODIUM SCH MLS/HR: 250; 2 INJECTION, SOLUTION INTRAVENOUS at 15:13

## 2018-10-15 RX ADMIN — Medication SCH MG: at 08:01

## 2018-10-15 RX ADMIN — HEPARIN SODIUM SCH UNIT: 5000 INJECTION, SOLUTION INTRAVENOUS; SUBCUTANEOUS at 08:02

## 2018-10-15 RX ADMIN — INSULIN LISPRO SCH UNITS: 100 INJECTION, SOLUTION INTRAVENOUS; SUBCUTANEOUS at 08:19

## 2018-10-15 RX ADMIN — Medication SCH MMU: at 08:01

## 2018-10-15 RX ADMIN — Medication SCH MMU: at 20:17

## 2018-10-15 RX ADMIN — Medication NR EACH: at 10:00

## 2018-10-16 VITALS — SYSTOLIC BLOOD PRESSURE: 103 MMHG | DIASTOLIC BLOOD PRESSURE: 55 MMHG

## 2018-10-16 VITALS — DIASTOLIC BLOOD PRESSURE: 84 MMHG | SYSTOLIC BLOOD PRESSURE: 136 MMHG

## 2018-10-16 VITALS — SYSTOLIC BLOOD PRESSURE: 143 MMHG | DIASTOLIC BLOOD PRESSURE: 61 MMHG

## 2018-10-16 VITALS — SYSTOLIC BLOOD PRESSURE: 151 MMHG | DIASTOLIC BLOOD PRESSURE: 63 MMHG

## 2018-10-16 VITALS — DIASTOLIC BLOOD PRESSURE: 43 MMHG | SYSTOLIC BLOOD PRESSURE: 83 MMHG

## 2018-10-16 VITALS — SYSTOLIC BLOOD PRESSURE: 134 MMHG | DIASTOLIC BLOOD PRESSURE: 66 MMHG

## 2018-10-16 VITALS — SYSTOLIC BLOOD PRESSURE: 142 MMHG | DIASTOLIC BLOOD PRESSURE: 72 MMHG

## 2018-10-16 VITALS — SYSTOLIC BLOOD PRESSURE: 175 MMHG | DIASTOLIC BLOOD PRESSURE: 81 MMHG

## 2018-10-16 LAB
ALBUMIN SERPL BCP-MCNC: 2.4 G/DL (ref 3.4–5)
ALBUMIN/GLOB SERPL: 0.8 {RATIO} (ref 1.1–1.5)
ALP SERPL-CCNC: 56 IU/L (ref 46–116)
ALT SERPL W P-5'-P-CCNC: 49 U/L (ref 12–78)
ANION GAP SERPL CALCULATED.3IONS-SCNC: 10 MMOL/L (ref 8–16)
AST SERPL W P-5'-P-CCNC: 36 U/L (ref 10–37)
BASOPHILS # BLD AUTO: 0 X10'3 (ref 0–0.2)
BASOPHILS NFR BLD AUTO: 0.1 % (ref 0–1)
BILIRUB SERPL-MCNC: 0.4 MG/DL (ref 0.1–1)
BUN SERPL-MCNC: 16 MG/DL (ref 7–18)
BUN/CREAT SERPL: 5.9 (ref 5.4–32)
CALCIUM SERPL-MCNC: 7.6 MG/DL (ref 8.5–10.1)
CHLORIDE SERPL-SCNC: 103 MMOL/L (ref 99–107)
CO2 SERPL-SCNC: 29 MMOL/L (ref 24–32)
CREAT SERPL-MCNC: 2.7 MG/DL (ref 0.6–1.1)
EOSINOPHIL # BLD AUTO: 0 X10'3 (ref 0–0.9)
EOSINOPHIL NFR BLD AUTO: 0.4 % (ref 0–6)
EOSINOPHIL NFR BLD MANUAL: 2 % (ref 0–6)
ERYTHROCYTE [DISTWIDTH] IN BLOOD BY AUTOMATED COUNT: 13.7 % (ref 11.5–14.5)
GFR SERPL CREATININE-BSD FRML MDRD: 25 ML/MIN
GLUCOSE SERPL-MCNC: 112 MG/DL (ref 70–104)
HCT VFR BLD AUTO: 23.8 % (ref 42–52)
HGB BLD-MCNC: 7.8 G/DL (ref 14–17.9)
LYMPHOCYTES # BLD AUTO: 1 X10'3 (ref 1.1–4.8)
LYMPHOCYTES NFR BLD AUTO: 10.7 % (ref 21–51)
LYMPHOCYTES NFR BLD MANUAL: 17 % (ref 21–51)
MAGNESIUM SERPL-MCNC: 1.5 MG/DL (ref 1.5–2.4)
MCH RBC QN AUTO: 30.2 PG (ref 27–31)
MCHC RBC AUTO-ENTMCNC: 32.9 % (ref 33–36.5)
MCV RBC AUTO: 91.8 FL (ref 78–98)
MONOCYTES # BLD AUTO: 1.1 X10'3 (ref 0–0.9)
MONOCYTES NFR BLD AUTO: 12.4 % (ref 2–12)
MONOCYTES NFR BLD MANUAL: 3 % (ref 2–12)
NEUTROPHILS # BLD AUTO: 6.9 X10'3 (ref 1.8–7.7)
NEUTROPHILS NFR BLD AUTO: 76.4 % (ref 42–75)
NEUTS BAND # BLD MANUAL: 3 % (ref 0–10)
NEUTS SEG NFR BLD MANUAL: 75 % (ref 42–75)
NRBC BLD MANUAL-RTO: 5 /100WBC (ref 0–0)
PLATELET # BLD AUTO: 188 X10'3 (ref 140–440)
PLATELET BLD QL SMEAR: NORMAL
PMV BLD AUTO: 7.4 FL (ref 7.4–10.4)
POLYCHROMASIA BLD QL SMEAR: (no result)
POTASSIUM SERPL-SCNC: 3.4 MMOL/L (ref 3.5–5.1)
PROT SERPL-MCNC: 5.5 G/DL (ref 6.4–8.2)
RBC # BLD AUTO: 2.6 X10'6 (ref 4.7–6.1)
RBC MORPH BLD: (no result)
SODIUM SERPL-SCNC: 142 MMOL/L (ref 135–145)
TOTAL CELLS COUNTED FLD: 100
VANCOMYCIN SERPL-MCNC: 20.9 UG/ML
WBC # BLD AUTO: 9.1 X10'3 (ref 4.5–11)

## 2018-10-16 RX ADMIN — INSULIN LISPRO SCH UNITS: 100 INJECTION, SOLUTION INTRAVENOUS; SUBCUTANEOUS at 19:45

## 2018-10-16 RX ADMIN — HYDRALAZINE HYDROCHLORIDE PRN MG: 20 INJECTION INTRAMUSCULAR; INTRAVENOUS at 07:51

## 2018-10-16 RX ADMIN — HEPARIN SODIUM SCH UNIT: 5000 INJECTION, SOLUTION INTRAVENOUS; SUBCUTANEOUS at 07:51

## 2018-10-16 RX ADMIN — Medication SCH MG: at 07:50

## 2018-10-16 RX ADMIN — Medication SCH MMU: at 19:41

## 2018-10-16 RX ADMIN — INSULIN GLARGINE SCH UNIT: 100 INJECTION, SOLUTION SUBCUTANEOUS at 21:00

## 2018-10-16 RX ADMIN — TAZOBACTAM SODIUM AND PIPERACILLIN SODIUM SCH MLS/HR: 250; 2 INJECTION, SOLUTION INTRAVENOUS at 19:40

## 2018-10-16 RX ADMIN — INSULIN LISPRO SCH UNITS: 100 INJECTION, SOLUTION INTRAVENOUS; SUBCUTANEOUS at 09:59

## 2018-10-16 RX ADMIN — TAZOBACTAM SODIUM AND PIPERACILLIN SODIUM SCH MLS/HR: 250; 2 INJECTION, SOLUTION INTRAVENOUS at 04:54

## 2018-10-16 RX ADMIN — INSULIN LISPRO SCH UNITS: 100 INJECTION, SOLUTION INTRAVENOUS; SUBCUTANEOUS at 13:29

## 2018-10-16 RX ADMIN — Medication SCH MMU: at 07:50

## 2018-10-16 RX ADMIN — HEPARIN SODIUM SCH UNIT: 5000 INJECTION, SOLUTION INTRAVENOUS; SUBCUTANEOUS at 19:41

## 2018-10-16 RX ADMIN — TAZOBACTAM SODIUM AND PIPERACILLIN SODIUM SCH MLS/HR: 250; 2 INJECTION, SOLUTION INTRAVENOUS at 13:31

## 2018-10-16 RX ADMIN — LEVOTHYROXINE SODIUM SCH MCG: 100 TABLET ORAL at 07:50

## 2018-10-16 RX ADMIN — Medication NR EACH: at 10:00

## 2018-10-16 RX ADMIN — TAZOBACTAM SODIUM AND PIPERACILLIN SODIUM SCH MLS/HR: 250; 2 INJECTION, SOLUTION INTRAVENOUS at 00:04

## 2018-10-16 RX ADMIN — TAZOBACTAM SODIUM AND PIPERACILLIN SODIUM SCH MLS/HR: 250; 2 INJECTION, SOLUTION INTRAVENOUS at 07:52

## 2018-10-16 RX ADMIN — INSULIN GLARGINE SCH UNIT: 100 INJECTION, SOLUTION SUBCUTANEOUS at 00:11

## 2018-10-16 RX ADMIN — PANTOPRAZOLE SODIUM SCH MG: 40 TABLET, DELAYED RELEASE ORAL at 07:50

## 2018-10-17 VITALS — DIASTOLIC BLOOD PRESSURE: 65 MMHG | SYSTOLIC BLOOD PRESSURE: 105 MMHG

## 2018-10-17 VITALS — DIASTOLIC BLOOD PRESSURE: 76 MMHG | SYSTOLIC BLOOD PRESSURE: 166 MMHG

## 2018-10-17 VITALS — SYSTOLIC BLOOD PRESSURE: 194 MMHG | DIASTOLIC BLOOD PRESSURE: 81 MMHG

## 2018-10-17 LAB
ALBUMIN SERPL BCP-MCNC: 2.4 G/DL (ref 3.4–5)
ALBUMIN/GLOB SERPL: 0.8 {RATIO} (ref 1.1–1.5)
ALP SERPL-CCNC: 52 IU/L (ref 46–116)
ALT SERPL W P-5'-P-CCNC: 41 U/L (ref 12–78)
ANION GAP SERPL CALCULATED.3IONS-SCNC: 8 MMOL/L (ref 8–16)
AST SERPL W P-5'-P-CCNC: 29 U/L (ref 10–37)
BASOPHILS # BLD AUTO: 0 X10'3 (ref 0–0.2)
BASOPHILS NFR BLD AUTO: 0.2 % (ref 0–1)
BILIRUB SERPL-MCNC: 0.5 MG/DL (ref 0.1–1)
BUN SERPL-MCNC: 18 MG/DL (ref 7–18)
BUN/CREAT SERPL: 5.9 (ref 5.4–32)
CALCIUM SERPL-MCNC: 7.8 MG/DL (ref 8.5–10.1)
CHLORIDE SERPL-SCNC: 102 MMOL/L (ref 99–107)
CO2 SERPL-SCNC: 28.4 MMOL/L (ref 24–32)
CREAT SERPL-MCNC: 3.05 MG/DL (ref 0.6–1.1)
EOSINOPHIL # BLD AUTO: 0.2 X10'3 (ref 0–0.9)
EOSINOPHIL NFR BLD AUTO: 2.6 % (ref 0–6)
ERYTHROCYTE [DISTWIDTH] IN BLOOD BY AUTOMATED COUNT: 13.4 % (ref 11.5–14.5)
FERRITIN SERPL-MCNC: 724 NG/ML (ref 26–388)
GFR SERPL CREATININE-BSD FRML MDRD: 21 ML/MIN
GLUCOSE SERPL-MCNC: 126 MG/DL (ref 70–104)
HCT VFR BLD AUTO: 23.3 % (ref 42–52)
HGB BLD-MCNC: 7.7 G/DL (ref 14–17.9)
HYPOCHROMIA BLD QL SMEAR: (no result)
IRON SATN MFR SERPL: 160 UG/DL (ref 259–388)
IRON SATN MFR SERPL: 31 % (ref 11–46)
IRON SERPL-MCNC: 49 UG/DL (ref 53–167)
LYMPHOCYTES # BLD AUTO: 1.2 X10'3 (ref 1.1–4.8)
LYMPHOCYTES NFR BLD AUTO: 13.8 % (ref 21–51)
MAGNESIUM SERPL-MCNC: 2.1 MG/DL (ref 1.5–2.4)
MCH RBC QN AUTO: 30.4 PG (ref 27–31)
MCHC RBC AUTO-ENTMCNC: 33 % (ref 33–36.5)
MCV RBC AUTO: 92.3 FL (ref 78–98)
MONOCYTES # BLD AUTO: 1 X10'3 (ref 0–0.9)
MONOCYTES NFR BLD AUTO: 11.5 % (ref 2–12)
NEUTROPHILS # BLD AUTO: 6.1 X10'3 (ref 1.8–7.7)
NEUTROPHILS NFR BLD AUTO: 71.9 % (ref 42–75)
PLATELET # BLD AUTO: 190 X10'3 (ref 140–440)
PLATELET BLD QL SMEAR: NORMAL
PMV BLD AUTO: 7.5 FL (ref 7.4–10.4)
POLYCHROMASIA BLD QL SMEAR: (no result)
POTASSIUM SERPL-SCNC: 3.4 MMOL/L (ref 3.5–5.1)
PROT SERPL-MCNC: 5.3 G/DL (ref 6.4–8.2)
RBC # BLD AUTO: 2.53 X10'6 (ref 4.7–6.1)
RBC MORPH BLD: (no result)
SODIUM SERPL-SCNC: 138 MMOL/L (ref 135–145)
VANCOMYCIN SERPL-MCNC: 15 UG/ML
WBC # BLD AUTO: 8.5 X10'3 (ref 4.5–11)

## 2018-10-17 PROCEDURE — 5A1D70Z PERFORMANCE OF URINARY FILTRATION, INTERMITTENT, LESS THAN 6 HOURS PER DAY: ICD-10-PCS | Performed by: INTERNAL MEDICINE

## 2018-10-17 RX ADMIN — HYDRALAZINE HYDROCHLORIDE PRN MG: 20 INJECTION INTRAMUSCULAR; INTRAVENOUS at 02:57

## 2018-10-17 RX ADMIN — Medication SCH MMU: at 08:21

## 2018-10-17 RX ADMIN — TAZOBACTAM SODIUM AND PIPERACILLIN SODIUM SCH MLS/HR: 250; 2 INJECTION, SOLUTION INTRAVENOUS at 02:57

## 2018-10-17 RX ADMIN — HEPARIN SODIUM SCH UNIT: 5000 INJECTION, SOLUTION INTRAVENOUS; SUBCUTANEOUS at 08:24

## 2018-10-17 RX ADMIN — INSULIN LISPRO SCH UNITS: 100 INJECTION, SOLUTION INTRAVENOUS; SUBCUTANEOUS at 08:51

## 2018-10-17 RX ADMIN — TAZOBACTAM SODIUM AND PIPERACILLIN SODIUM SCH MLS/HR: 250; 2 INJECTION, SOLUTION INTRAVENOUS at 08:20

## 2018-10-17 RX ADMIN — PANTOPRAZOLE SODIUM SCH MG: 40 TABLET, DELAYED RELEASE ORAL at 08:20

## 2018-10-17 RX ADMIN — Medication NR EACH: at 10:00

## 2018-10-17 RX ADMIN — LEVOTHYROXINE SODIUM SCH MCG: 100 TABLET ORAL at 08:20

## 2018-10-17 RX ADMIN — Medication SCH MG: at 08:20

## 2019-04-01 ENCOUNTER — HOSPITAL ENCOUNTER (INPATIENT)
Dept: HOSPITAL 94 - ED HOLD | Age: 59
LOS: 4 days | Discharge: HOME | End: 2019-04-05
Payer: COMMERCIAL

## 2019-04-13 ENCOUNTER — HOSPITAL ENCOUNTER (EMERGENCY)
Dept: HOSPITAL 94 - ER | Age: 59
Discharge: HOME | End: 2019-04-13
Payer: COMMERCIAL

## 2019-04-13 VITALS — HEIGHT: 66 IN | BODY MASS INDEX: 23.35 KG/M2 | WEIGHT: 145.31 LBS

## 2019-04-13 VITALS — DIASTOLIC BLOOD PRESSURE: 69 MMHG | SYSTOLIC BLOOD PRESSURE: 155 MMHG

## 2019-04-13 DIAGNOSIS — R11.2: Primary | ICD-10-CM

## 2019-04-13 DIAGNOSIS — K21.9: ICD-10-CM

## 2019-04-13 DIAGNOSIS — E78.00: ICD-10-CM

## 2019-04-13 DIAGNOSIS — Z79.82: ICD-10-CM

## 2019-04-13 DIAGNOSIS — E11.22: ICD-10-CM

## 2019-04-13 DIAGNOSIS — Z99.2: ICD-10-CM

## 2019-04-13 DIAGNOSIS — E11.40: ICD-10-CM

## 2019-04-13 DIAGNOSIS — I25.10: ICD-10-CM

## 2019-04-13 DIAGNOSIS — F12.90: ICD-10-CM

## 2019-04-13 DIAGNOSIS — I50.9: ICD-10-CM

## 2019-04-13 DIAGNOSIS — G89.29: ICD-10-CM

## 2019-04-13 DIAGNOSIS — Z79.899: ICD-10-CM

## 2019-04-13 DIAGNOSIS — R10.84: ICD-10-CM

## 2019-04-13 DIAGNOSIS — I13.0: ICD-10-CM

## 2019-04-13 DIAGNOSIS — N18.9: ICD-10-CM

## 2019-04-13 DIAGNOSIS — F15.90: ICD-10-CM

## 2019-04-13 DIAGNOSIS — Z90.49: ICD-10-CM

## 2019-04-13 DIAGNOSIS — E03.9: ICD-10-CM

## 2019-04-13 LAB
ALBUMIN SERPL BCP-MCNC: 3.5 G/DL (ref 3.4–5)
ALBUMIN/GLOB SERPL: 0.9 {RATIO} (ref 1.1–1.5)
ALP SERPL-CCNC: 80 IU/L (ref 46–116)
ALT SERPL W P-5'-P-CCNC: 29 U/L (ref 12–78)
ANION GAP SERPL CALCULATED.3IONS-SCNC: 11 MMOL/L (ref 8–16)
AST SERPL W P-5'-P-CCNC: 21 U/L (ref 10–37)
BASOPHILS # BLD AUTO: 0.1 X10'3 (ref 0–0.2)
BASOPHILS NFR BLD AUTO: 0.5 % (ref 0–1)
BILIRUB SERPL-MCNC: 0.4 MG/DL (ref 0.1–1)
BUN SERPL-MCNC: 27 MG/DL (ref 7–18)
BUN/CREAT SERPL: 5.7 (ref 5.4–32)
CALCIUM SERPL-MCNC: 9.5 MG/DL (ref 8.5–10.1)
CHLORIDE SERPL-SCNC: 100 MMOL/L (ref 99–107)
CHOLEST SERPL-MCNC: 91 MG/DL (ref 0–200)
CHOLEST/HDLC SERPL: 2.2 {RATIO} (ref 0–4.99)
CO2 SERPL-SCNC: 27.6 MMOL/L (ref 24–32)
CREAT SERPL-MCNC: 4.75 MG/DL (ref 0.6–1.1)
EOSINOPHIL # BLD AUTO: 0 X10'3 (ref 0–0.9)
EOSINOPHIL NFR BLD AUTO: 0.4 % (ref 0–6)
ERYTHROCYTE [DISTWIDTH] IN BLOOD BY AUTOMATED COUNT: 17.1 % (ref 11.5–14.5)
ETHANOL SERPL-MCNC: < 0.01 GM/DL (ref 0–0.01)
GFR SERPL CREATININE-BSD FRML MDRD: 13 ML/MIN
GLUCOSE SERPL-MCNC: 185 MG/DL (ref 70–104)
HCT VFR BLD AUTO: 29.9 % (ref 42–52)
HDLC SERPL-MCNC: 41 MG/DL (ref 35–60)
HGB BLD-MCNC: 9.9 G/DL (ref 14–17.9)
INR PPP: 1 INR
LDLC SERPL DIRECT ASSAY-MCNC: 36 MG/DL (ref 50–100)
LIPASE SERPL-CCNC: 147 U/L (ref 73–393)
LYMPHOCYTES # BLD AUTO: 1.7 X10'3 (ref 1.1–4.8)
LYMPHOCYTES NFR BLD AUTO: 17.5 % (ref 21–51)
MAGNESIUM SERPL-MCNC: 1.9 MG/DL (ref 1.5–2.4)
MCH RBC QN AUTO: 30.6 PG (ref 27–31)
MCHC RBC AUTO-ENTMCNC: 33.2 G/DL (ref 33–36.5)
MCV RBC AUTO: 92.3 FL (ref 78–98)
MONOCYTES # BLD AUTO: 0.8 X10'3 (ref 0–0.9)
MONOCYTES NFR BLD AUTO: 8.4 % (ref 2–12)
NEUTROPHILS # BLD AUTO: 7.2 X10'3 (ref 1.8–7.7)
NEUTROPHILS NFR BLD AUTO: 73.2 % (ref 42–75)
PLATELET # BLD AUTO: 299 X10'3 (ref 140–440)
PMV BLD AUTO: 6.8 FL (ref 7.4–10.4)
POTASSIUM SERPL-SCNC: 3.8 MMOL/L (ref 3.5–5.1)
PROT SERPL-MCNC: 7.2 G/DL (ref 6.4–8.2)
PROTHROMBIN TIME: 10.3 SECONDS (ref 9–12)
RBC # BLD AUTO: 3.24 X10'6 (ref 4.7–6.1)
SODIUM SERPL-SCNC: 139 MMOL/L (ref 135–145)
TRIGL SERPL-MCNC: 126 MG/DL (ref 20–135)
TROPONIN I SERPL-MCNC: 0.05 NG/ML (ref 0–0.05)
WBC # BLD AUTO: 9.9 X10'3 (ref 4.5–11)

## 2019-04-13 PROCEDURE — 36415 COLL VENOUS BLD VENIPUNCTURE: CPT

## 2019-04-13 PROCEDURE — 96361 HYDRATE IV INFUSION ADD-ON: CPT

## 2019-04-13 PROCEDURE — 85025 COMPLETE CBC W/AUTO DIFF WBC: CPT

## 2019-04-13 PROCEDURE — 80053 COMPREHEN METABOLIC PANEL: CPT

## 2019-04-13 PROCEDURE — 96375 TX/PRO/DX INJ NEW DRUG ADDON: CPT

## 2019-04-13 PROCEDURE — 99284 EMERGENCY DEPT VISIT MOD MDM: CPT

## 2019-04-13 PROCEDURE — 96372 THER/PROPH/DIAG INJ SC/IM: CPT

## 2019-04-13 PROCEDURE — 74176 CT ABD & PELVIS W/O CONTRAST: CPT

## 2019-04-13 PROCEDURE — 82948 REAGENT STRIP/BLOOD GLUCOSE: CPT

## 2019-04-13 PROCEDURE — 96374 THER/PROPH/DIAG INJ IV PUSH: CPT

## 2019-04-13 PROCEDURE — 84484 ASSAY OF TROPONIN QUANT: CPT

## 2019-04-13 PROCEDURE — 80061 LIPID PANEL: CPT

## 2019-04-13 PROCEDURE — 80320 DRUG SCREEN QUANTALCOHOLS: CPT

## 2019-04-13 PROCEDURE — 83690 ASSAY OF LIPASE: CPT

## 2019-04-13 PROCEDURE — 83735 ASSAY OF MAGNESIUM: CPT

## 2019-04-13 PROCEDURE — 85610 PROTHROMBIN TIME: CPT

## 2019-04-16 ENCOUNTER — HOSPITAL ENCOUNTER (INPATIENT)
Dept: HOSPITAL 94 - ER | Age: 59
LOS: 2 days | Discharge: HOME | DRG: 73 | End: 2019-04-18
Attending: INTERNAL MEDICINE | Admitting: INTERNAL MEDICINE
Payer: COMMERCIAL

## 2019-04-16 VITALS — WEIGHT: 158.73 LBS | HEIGHT: 66 IN | BODY MASS INDEX: 25.51 KG/M2

## 2019-04-16 DIAGNOSIS — D64.9: ICD-10-CM

## 2019-04-16 DIAGNOSIS — I13.2: ICD-10-CM

## 2019-04-16 DIAGNOSIS — I48.0: ICD-10-CM

## 2019-04-16 DIAGNOSIS — E11.42: ICD-10-CM

## 2019-04-16 DIAGNOSIS — Z79.890: ICD-10-CM

## 2019-04-16 DIAGNOSIS — K31.84: ICD-10-CM

## 2019-04-16 DIAGNOSIS — Z79.82: ICD-10-CM

## 2019-04-16 DIAGNOSIS — E11.43: Primary | ICD-10-CM

## 2019-04-16 DIAGNOSIS — E11.22: ICD-10-CM

## 2019-04-16 DIAGNOSIS — F32.9: ICD-10-CM

## 2019-04-16 DIAGNOSIS — Z79.899: ICD-10-CM

## 2019-04-16 DIAGNOSIS — Z82.49: ICD-10-CM

## 2019-04-16 DIAGNOSIS — E87.6: ICD-10-CM

## 2019-04-16 DIAGNOSIS — Z90.49: ICD-10-CM

## 2019-04-16 DIAGNOSIS — G89.4: ICD-10-CM

## 2019-04-16 DIAGNOSIS — N18.6: ICD-10-CM

## 2019-04-16 DIAGNOSIS — Z83.3: ICD-10-CM

## 2019-04-16 DIAGNOSIS — Z99.2: ICD-10-CM

## 2019-04-16 DIAGNOSIS — F12.90: ICD-10-CM

## 2019-04-16 DIAGNOSIS — E03.9: ICD-10-CM

## 2019-04-16 DIAGNOSIS — K21.9: ICD-10-CM

## 2019-04-16 DIAGNOSIS — Z79.84: ICD-10-CM

## 2019-04-16 DIAGNOSIS — E78.00: ICD-10-CM

## 2019-04-16 DIAGNOSIS — I50.9: ICD-10-CM

## 2019-04-16 DIAGNOSIS — Z82.3: ICD-10-CM

## 2019-04-16 LAB
ALBUMIN SERPL BCP-MCNC: 2.7 G/DL (ref 3.4–5)
ALBUMIN/GLOB SERPL: 0.9 {RATIO} (ref 1.1–1.5)
ALP SERPL-CCNC: 70 IU/L (ref 46–116)
ALT SERPL W P-5'-P-CCNC: 24 U/L (ref 12–78)
ANION GAP SERPL CALCULATED.3IONS-SCNC: 12 MMOL/L (ref 8–16)
APTT PPP: 31 SECONDS (ref 22–32)
AST SERPL W P-5'-P-CCNC: 35 U/L (ref 10–37)
BASOPHILS # BLD AUTO: 0 X10'3 (ref 0–0.2)
BASOPHILS NFR BLD AUTO: 0.3 % (ref 0–1)
BILIRUB SERPL-MCNC: 0.7 MG/DL (ref 0.1–1)
BUN SERPL-MCNC: 10 MG/DL (ref 7–18)
BUN/CREAT SERPL: 4 (ref 5.4–32)
CALCIUM SERPL-MCNC: 8.1 MG/DL (ref 8.5–10.1)
CHLORIDE SERPL-SCNC: 101 MMOL/L (ref 99–107)
CO2 SERPL-SCNC: 26.2 MMOL/L (ref 24–32)
CREAT SERPL-MCNC: 2.51 MG/DL (ref 0.6–1.1)
EOSINOPHIL # BLD AUTO: 0 X10'3 (ref 0–0.9)
EOSINOPHIL NFR BLD AUTO: 0.3 % (ref 0–6)
ERYTHROCYTE [DISTWIDTH] IN BLOOD BY AUTOMATED COUNT: 17.1 % (ref 11.5–14.5)
GFR SERPL CREATININE-BSD FRML MDRD: 27 ML/MIN
GLUCOSE SERPL-MCNC: 91 MG/DL (ref 70–104)
HCT VFR BLD AUTO: 26.3 % (ref 42–52)
HGB BLD-MCNC: 8.6 G/DL (ref 14–17.9)
INR PPP: 1.1 INR
LIPASE SERPL-CCNC: 88 U/L (ref 73–393)
LYMPHOCYTES # BLD AUTO: 0.5 X10'3 (ref 1.1–4.8)
LYMPHOCYTES NFR BLD AUTO: 4.6 % (ref 21–51)
MAGNESIUM SERPL-MCNC: 1.7 MG/DL (ref 1.5–2.4)
MCH RBC QN AUTO: 30.6 PG (ref 27–31)
MCHC RBC AUTO-ENTMCNC: 32.9 G/DL (ref 33–36.5)
MCV RBC AUTO: 93.1 FL (ref 78–98)
MONOCYTES # BLD AUTO: 0.4 X10'3 (ref 0–0.9)
MONOCYTES NFR BLD AUTO: 3.8 % (ref 2–12)
NEUTROPHILS # BLD AUTO: 9.5 X10'3 (ref 1.8–7.7)
NEUTROPHILS NFR BLD AUTO: 91 % (ref 42–75)
PLATELET # BLD AUTO: 267 X10'3 (ref 140–440)
PMV BLD AUTO: 6.3 FL (ref 7.4–10.4)
POTASSIUM SERPL-SCNC: 2.7 MMOL/L (ref 3.5–5.1)
PROT SERPL-MCNC: 5.6 G/DL (ref 6.4–8.2)
RBC # BLD AUTO: 2.83 X10'6 (ref 4.7–6.1)
SODIUM SERPL-SCNC: 139 MMOL/L (ref 135–145)
WBC # BLD AUTO: 10.4 X10'3 (ref 4.5–11)

## 2019-04-16 PROCEDURE — 83605 ASSAY OF LACTIC ACID: CPT

## 2019-04-16 PROCEDURE — 83735 ASSAY OF MAGNESIUM: CPT

## 2019-04-16 PROCEDURE — 87040 BLOOD CULTURE FOR BACTERIA: CPT

## 2019-04-16 PROCEDURE — 96376 TX/PRO/DX INJ SAME DRUG ADON: CPT

## 2019-04-16 PROCEDURE — 36415 COLL VENOUS BLD VENIPUNCTURE: CPT

## 2019-04-16 PROCEDURE — 96365 THER/PROPH/DIAG IV INF INIT: CPT

## 2019-04-16 PROCEDURE — 96368 THER/DIAG CONCURRENT INF: CPT

## 2019-04-16 PROCEDURE — 85025 COMPLETE CBC W/AUTO DIFF WBC: CPT

## 2019-04-16 PROCEDURE — 99291 CRITICAL CARE FIRST HOUR: CPT

## 2019-04-16 PROCEDURE — 84484 ASSAY OF TROPONIN QUANT: CPT

## 2019-04-16 PROCEDURE — 84132 ASSAY OF SERUM POTASSIUM: CPT

## 2019-04-16 PROCEDURE — 80053 COMPREHEN METABOLIC PANEL: CPT

## 2019-04-16 PROCEDURE — 82948 REAGENT STRIP/BLOOD GLUCOSE: CPT

## 2019-04-16 PROCEDURE — 83690 ASSAY OF LIPASE: CPT

## 2019-04-16 PROCEDURE — 93005 ELECTROCARDIOGRAM TRACING: CPT

## 2019-04-16 PROCEDURE — 71045 X-RAY EXAM CHEST 1 VIEW: CPT

## 2019-04-16 PROCEDURE — 87070 CULTURE OTHR SPECIMN AEROBIC: CPT

## 2019-04-16 PROCEDURE — 84100 ASSAY OF PHOSPHORUS: CPT

## 2019-04-16 PROCEDURE — 85610 PROTHROMBIN TIME: CPT

## 2019-04-16 PROCEDURE — 85730 THROMBOPLASTIN TIME PARTIAL: CPT

## 2019-04-16 PROCEDURE — 96375 TX/PRO/DX INJ NEW DRUG ADDON: CPT

## 2019-04-16 RX ADMIN — HEPARIN SODIUM SCH UNIT: 5000 INJECTION, SOLUTION INTRAVENOUS; SUBCUTANEOUS at 20:10

## 2019-04-16 RX ADMIN — ONDANSETRON PRN MG: 2 INJECTION, SOLUTION INTRAMUSCULAR; INTRAVENOUS at 16:34

## 2019-04-16 RX ADMIN — METOCLOPRAMIDE SCH MG: 5 INJECTION, SOLUTION INTRAMUSCULAR; INTRAVENOUS at 20:10

## 2019-04-16 RX ADMIN — METOCLOPRAMIDE SCH MG: 5 INJECTION, SOLUTION INTRAMUSCULAR; INTRAVENOUS at 13:13

## 2019-04-16 NOTE — NUR
-------------------------------------------------------------------------------

           *** Note undone in ED - 04/16/19 at 2308 by AGUSTINA ***           

-------------------------------------------------------------------------------

DR NORRIS MADE AWARE OF SBP 60'S X2. CENTRAL LINE SET UP ORDERED.

## 2019-04-17 VITALS — DIASTOLIC BLOOD PRESSURE: 77 MMHG | SYSTOLIC BLOOD PRESSURE: 168 MMHG

## 2019-04-17 VITALS — SYSTOLIC BLOOD PRESSURE: 165 MMHG | DIASTOLIC BLOOD PRESSURE: 81 MMHG

## 2019-04-17 LAB
ALBUMIN SERPL BCP-MCNC: 2.9 G/DL (ref 3.4–5)
ALBUMIN/GLOB SERPL: 0.9 {RATIO} (ref 1.1–1.5)
ALP SERPL-CCNC: 76 IU/L (ref 46–116)
ALT SERPL W P-5'-P-CCNC: 31 U/L (ref 12–78)
ANION GAP SERPL CALCULATED.3IONS-SCNC: 14 MMOL/L (ref 8–16)
AST SERPL W P-5'-P-CCNC: 31 U/L (ref 10–37)
BASOPHILS # BLD AUTO: 0.1 X10'3 (ref 0–0.2)
BASOPHILS NFR BLD AUTO: 0.5 % (ref 0–1)
BILIRUB SERPL-MCNC: 0.6 MG/DL (ref 0.1–1)
BUN SERPL-MCNC: 15 MG/DL (ref 7–18)
BUN/CREAT SERPL: 4.1 (ref 5.4–32)
CALCIUM SERPL-MCNC: 8.6 MG/DL (ref 8.5–10.1)
CHLORIDE SERPL-SCNC: 102 MMOL/L (ref 99–107)
CO2 SERPL-SCNC: 27 MMOL/L (ref 24–32)
CREAT SERPL-MCNC: 3.68 MG/DL (ref 0.6–1.1)
EOSINOPHIL # BLD AUTO: 0 X10'3 (ref 0–0.9)
EOSINOPHIL NFR BLD AUTO: 0.2 % (ref 0–6)
ERYTHROCYTE [DISTWIDTH] IN BLOOD BY AUTOMATED COUNT: 17.9 % (ref 11.5–14.5)
GFR SERPL CREATININE-BSD FRML MDRD: 17 ML/MIN
GLUCOSE SERPL-MCNC: 92 MG/DL (ref 70–104)
HCT VFR BLD AUTO: 30.3 % (ref 42–52)
HGB BLD-MCNC: 10 G/DL (ref 14–17.9)
LYMPHOCYTES # BLD AUTO: 1.2 X10'3 (ref 1.1–4.8)
LYMPHOCYTES NFR BLD AUTO: 11.1 % (ref 21–51)
MAGNESIUM SERPL-MCNC: 2.1 MG/DL (ref 1.5–2.4)
MCH RBC QN AUTO: 31 PG (ref 27–31)
MCHC RBC AUTO-ENTMCNC: 33 G/DL (ref 33–36.5)
MCV RBC AUTO: 93.8 FL (ref 78–98)
MONOCYTES # BLD AUTO: 1 X10'3 (ref 0–0.9)
MONOCYTES NFR BLD AUTO: 8.8 % (ref 2–12)
NEUTROPHILS # BLD AUTO: 8.7 X10'3 (ref 1.8–7.7)
NEUTROPHILS NFR BLD AUTO: 79.4 % (ref 42–75)
PHOSPHATE SERPL-MCNC: 2.6 MG/DL (ref 2.3–4.5)
PLATELET # BLD AUTO: 300 X10'3 (ref 140–440)
PMV BLD AUTO: 6.5 FL (ref 7.4–10.4)
POTASSIUM SERPL-SCNC: 3.3 MMOL/L (ref 3.5–5.1)
PROT SERPL-MCNC: 6.1 G/DL (ref 6.4–8.2)
RBC # BLD AUTO: 3.23 X10'6 (ref 4.7–6.1)
SODIUM SERPL-SCNC: 143 MMOL/L (ref 135–145)
WBC # BLD AUTO: 11 X10'3 (ref 4.5–11)

## 2019-04-17 RX ADMIN — METOCLOPRAMIDE SCH MG: 5 INJECTION, SOLUTION INTRAMUSCULAR; INTRAVENOUS at 02:43

## 2019-04-17 RX ADMIN — CALCIUM ACETATE SCH CAP: 667 CAPSULE ORAL at 18:51

## 2019-04-17 RX ADMIN — METOCLOPRAMIDE SCH MG: 5 INJECTION, SOLUTION INTRAMUSCULAR; INTRAVENOUS at 07:37

## 2019-04-17 RX ADMIN — ONDANSETRON PRN MG: 2 INJECTION, SOLUTION INTRAMUSCULAR; INTRAVENOUS at 03:04

## 2019-04-17 RX ADMIN — ONDANSETRON PRN MG: 2 INJECTION, SOLUTION INTRAMUSCULAR; INTRAVENOUS at 23:46

## 2019-04-17 RX ADMIN — HEPARIN SODIUM SCH UNIT: 5000 INJECTION, SOLUTION INTRAVENOUS; SUBCUTANEOUS at 09:29

## 2019-04-17 RX ADMIN — Medication SCH MG: at 08:00

## 2019-04-17 RX ADMIN — ONDANSETRON PRN MG: 2 INJECTION, SOLUTION INTRAMUSCULAR; INTRAVENOUS at 09:22

## 2019-04-17 RX ADMIN — PANTOPRAZOLE SODIUM SCH MG: 40 TABLET, DELAYED RELEASE ORAL at 09:37

## 2019-04-17 RX ADMIN — METOCLOPRAMIDE SCH MG: 5 INJECTION, SOLUTION INTRAMUSCULAR; INTRAVENOUS at 19:33

## 2019-04-17 RX ADMIN — CALCIUM ACETATE SCH CAP: 667 CAPSULE ORAL at 13:13

## 2019-04-17 RX ADMIN — METOCLOPRAMIDE SCH MG: 5 INJECTION, SOLUTION INTRAMUSCULAR; INTRAVENOUS at 14:46

## 2019-04-17 RX ADMIN — CALCIUM ACETATE SCH CAP: 667 CAPSULE ORAL at 07:00

## 2019-04-17 RX ADMIN — HEPARIN SODIUM SCH UNIT: 5000 INJECTION, SOLUTION INTRAVENOUS; SUBCUTANEOUS at 19:33

## 2019-04-17 NOTE — NUR
Patient in room PCU 3016. I have received report from  Sarah SPAULDING  and had the opportunity 
to ask questions and assume patient care.

## 2019-04-17 NOTE — NUR
PT VOMITING DESPITE REGLAN DOSE, SMALL AMOUNTS OF CLEAR/YELLOW. PT SAYS HE 
FEELS FINE JUST NAUSEATED. GLUCOSE IS 71, WILL TREAT WITH HALF AMP DEXTROSE DUE 
TO NAUSEA. WILL TRY ZOFRAN

## 2019-04-17 NOTE — NUR
MD zawada okay with NS at 20ml/hr, informed him of patient last BM, and no accuchecks. new 
orders entered

## 2019-04-18 VITALS — DIASTOLIC BLOOD PRESSURE: 78 MMHG | SYSTOLIC BLOOD PRESSURE: 194 MMHG

## 2019-04-18 VITALS — DIASTOLIC BLOOD PRESSURE: 85 MMHG | SYSTOLIC BLOOD PRESSURE: 184 MMHG

## 2019-04-18 VITALS — SYSTOLIC BLOOD PRESSURE: 170 MMHG | DIASTOLIC BLOOD PRESSURE: 88 MMHG

## 2019-04-18 LAB
ALBUMIN SERPL BCP-MCNC: 2.8 G/DL (ref 3.4–5)
ALBUMIN/GLOB SERPL: 0.9 {RATIO} (ref 1.1–1.5)
ALP SERPL-CCNC: 73 IU/L (ref 46–116)
ALT SERPL W P-5'-P-CCNC: 29 U/L (ref 12–78)
ANION GAP SERPL CALCULATED.3IONS-SCNC: 14 MMOL/L (ref 8–16)
AST SERPL W P-5'-P-CCNC: 29 U/L (ref 10–37)
BASOPHILS # BLD AUTO: 0.1 X10'3 (ref 0–0.2)
BASOPHILS NFR BLD AUTO: 0.8 % (ref 0–1)
BILIRUB SERPL-MCNC: 0.4 MG/DL (ref 0.1–1)
BUN SERPL-MCNC: 18 MG/DL (ref 7–18)
BUN/CREAT SERPL: 4.8 (ref 5.4–32)
CALCIUM SERPL-MCNC: 8.5 MG/DL (ref 8.5–10.1)
CHLORIDE SERPL-SCNC: 101 MMOL/L (ref 99–107)
CO2 SERPL-SCNC: 25.2 MMOL/L (ref 24–32)
CREAT SERPL-MCNC: 3.75 MG/DL (ref 0.6–1.1)
EOSINOPHIL # BLD AUTO: 0.1 X10'3 (ref 0–0.9)
EOSINOPHIL NFR BLD AUTO: 0.9 % (ref 0–6)
ERYTHROCYTE [DISTWIDTH] IN BLOOD BY AUTOMATED COUNT: 17.6 % (ref 11.5–14.5)
GFR SERPL CREATININE-BSD FRML MDRD: 17 ML/MIN
GLUCOSE SERPL-MCNC: 92 MG/DL (ref 70–104)
HCT VFR BLD AUTO: 28.1 % (ref 42–52)
HGB BLD-MCNC: 9.2 G/DL (ref 14–17.9)
LYMPHOCYTES # BLD AUTO: 0.6 X10'3 (ref 1.1–4.8)
LYMPHOCYTES NFR BLD AUTO: 8.1 % (ref 21–51)
MAGNESIUM SERPL-MCNC: 1.9 MG/DL (ref 1.5–2.4)
MCH RBC QN AUTO: 30.8 PG (ref 27–31)
MCHC RBC AUTO-ENTMCNC: 32.8 G/DL (ref 33–36.5)
MCV RBC AUTO: 93.8 FL (ref 78–98)
MONOCYTES # BLD AUTO: 0.6 X10'3 (ref 0–0.9)
MONOCYTES NFR BLD AUTO: 8 % (ref 2–12)
NEUTROPHILS # BLD AUTO: 6 X10'3 (ref 1.8–7.7)
NEUTROPHILS NFR BLD AUTO: 82.2 % (ref 42–75)
PHOSPHATE SERPL-MCNC: 2 MG/DL (ref 2.3–4.5)
PLATELET # BLD AUTO: 289 X10'3 (ref 140–440)
PMV BLD AUTO: 6.5 FL (ref 7.4–10.4)
POTASSIUM SERPL-SCNC: 3 MMOL/L (ref 3.5–5.1)
PROT SERPL-MCNC: 6 G/DL (ref 6.4–8.2)
RBC # BLD AUTO: 3 X10'6 (ref 4.7–6.1)
SODIUM SERPL-SCNC: 140 MMOL/L (ref 135–145)
WBC # BLD AUTO: 7.3 X10'3 (ref 4.5–11)

## 2019-04-18 PROCEDURE — 5A1D70Z PERFORMANCE OF URINARY FILTRATION, INTERMITTENT, LESS THAN 6 HOURS PER DAY: ICD-10-PCS | Performed by: INTERNAL MEDICINE

## 2019-04-18 RX ADMIN — HEPARIN SODIUM SCH UNIT: 5000 INJECTION, SOLUTION INTRAVENOUS; SUBCUTANEOUS at 07:21

## 2019-04-18 RX ADMIN — CALCIUM ACETATE SCH CAP: 667 CAPSULE ORAL at 12:30

## 2019-04-18 RX ADMIN — CALCIUM ACETATE SCH CAP: 667 CAPSULE ORAL at 07:20

## 2019-04-18 RX ADMIN — METOCLOPRAMIDE SCH MG: 5 INJECTION, SOLUTION INTRAMUSCULAR; INTRAVENOUS at 07:21

## 2019-04-18 RX ADMIN — Medication SCH MG: at 07:20

## 2019-04-18 RX ADMIN — METOCLOPRAMIDE SCH MG: 5 INJECTION, SOLUTION INTRAMUSCULAR; INTRAVENOUS at 01:16

## 2019-04-18 RX ADMIN — PANTOPRAZOLE SODIUM SCH MG: 40 TABLET, DELAYED RELEASE ORAL at 07:19

## 2019-04-18 NOTE — NUR
Paged Dr. Davis



PAGER ID:  [phone] 

MESSAGE:  Carlo SPAULDING [phone]: Ricky Dutta: /91. Two prior BPs in the 160s. Pt 
sleeping. no PRN antihypertensives ordered. Thank you.

## 2019-04-18 NOTE — NUR
Paged Dr. Davis



PAGER ID:  [phone] 

MESSAGE:  Carlo SPAULDING [phone] Ricky Dutta: Critical Lab value K+ 3.0. Thank you.

## 2019-04-18 NOTE — NUR
Patient Discharged.



Patient discharged home via private vehicle accompanied by older brother. IV catheter 
removed prior to discharge, catheter intact. Tele leads removed prior to discharge, tele box 
returned to tele tech. Discharge instructions discussed with patient via RN. All belongings 
sent home with patient. New prescriptions called into patient's preferred pharmacy prior to 
discharge. Patient escorted out of hospital in wheelchair accompanied by RN. All questions 
and concerns addressed with patient prior to discharge.

## 2019-12-05 ENCOUNTER — HOSPITAL ENCOUNTER (EMERGENCY)
Dept: HOSPITAL 94 - ER | Age: 59
Discharge: HOME | End: 2019-12-05
Payer: COMMERCIAL

## 2019-12-05 VITALS — HEIGHT: 65 IN | BODY MASS INDEX: 33.06 KG/M2 | WEIGHT: 198.42 LBS

## 2019-12-05 VITALS — DIASTOLIC BLOOD PRESSURE: 65 MMHG | SYSTOLIC BLOOD PRESSURE: 116 MMHG

## 2019-12-05 DIAGNOSIS — Z79.899: ICD-10-CM

## 2019-12-05 DIAGNOSIS — F32.9: ICD-10-CM

## 2019-12-05 DIAGNOSIS — N18.6: ICD-10-CM

## 2019-12-05 DIAGNOSIS — E78.00: ICD-10-CM

## 2019-12-05 DIAGNOSIS — E11.22: Primary | ICD-10-CM

## 2019-12-05 DIAGNOSIS — E11.42: ICD-10-CM

## 2019-12-05 DIAGNOSIS — I50.9: ICD-10-CM

## 2019-12-05 DIAGNOSIS — Z99.2: ICD-10-CM

## 2019-12-05 DIAGNOSIS — Z90.49: ICD-10-CM

## 2019-12-05 DIAGNOSIS — F12.90: ICD-10-CM

## 2019-12-05 DIAGNOSIS — K21.9: ICD-10-CM

## 2019-12-05 DIAGNOSIS — F15.90: ICD-10-CM

## 2019-12-05 DIAGNOSIS — Z79.82: ICD-10-CM

## 2019-12-05 DIAGNOSIS — I13.2: ICD-10-CM

## 2019-12-05 DIAGNOSIS — E03.9: ICD-10-CM

## 2019-12-05 DIAGNOSIS — G89.29: ICD-10-CM

## 2019-12-05 LAB
ALBUMIN SERPL BCP-MCNC: 3.3 G/DL (ref 3.4–5)
ALBUMIN/GLOB SERPL: 0.9 {RATIO} (ref 1.1–1.5)
ALP SERPL-CCNC: 97 IU/L (ref 46–116)
ALT SERPL W P-5'-P-CCNC: 31 U/L (ref 12–78)
ANION GAP SERPL CALCULATED.3IONS-SCNC: 11 MMOL/L (ref 8–16)
AST SERPL W P-5'-P-CCNC: 29 U/L (ref 10–37)
BASOPHILS # BLD AUTO: 0 X10'3 (ref 0–0.2)
BASOPHILS NFR BLD AUTO: 0.4 % (ref 0–1)
BILIRUB SERPL-MCNC: 0.4 MG/DL (ref 0.1–1)
BUN SERPL-MCNC: 42 MG/DL (ref 7–18)
BUN/CREAT SERPL: 12.6 (ref 5.4–32)
CALCIUM SERPL-MCNC: 9.4 MG/DL (ref 8.5–10.1)
CHLORIDE SERPL-SCNC: 97 MMOL/L (ref 99–107)
CO2 SERPL-SCNC: 29.3 MMOL/L (ref 24–32)
CREAT SERPL-MCNC: 3.33 MG/DL (ref 0.6–1.1)
EOSINOPHIL # BLD AUTO: 0.1 X10'3 (ref 0–0.9)
EOSINOPHIL NFR BLD AUTO: 0.8 % (ref 0–6)
ERYTHROCYTE [DISTWIDTH] IN BLOOD BY AUTOMATED COUNT: 15.2 % (ref 11.5–14.5)
GFR SERPL CREATININE-BSD FRML MDRD: 19 ML/MIN
GLUCOSE SERPL-MCNC: 140 MG/DL (ref 70–104)
HCT VFR BLD AUTO: 30.5 % (ref 42–52)
HGB BLD-MCNC: 10.3 G/DL (ref 14–17.9)
LYMPHOCYTES # BLD AUTO: 1.1 X10'3 (ref 1.1–4.8)
LYMPHOCYTES NFR BLD AUTO: 11.2 % (ref 21–51)
MAGNESIUM SERPL-MCNC: 1.7 MG/DL (ref 1.5–2.4)
MCH RBC QN AUTO: 32.2 PG (ref 27–31)
MCHC RBC AUTO-ENTMCNC: 33.7 G/DL (ref 33–36.5)
MCV RBC AUTO: 95.5 FL (ref 78–98)
MONOCYTES # BLD AUTO: 0.7 X10'3 (ref 0–0.9)
MONOCYTES NFR BLD AUTO: 6.9 % (ref 2–12)
NEUTROPHILS # BLD AUTO: 8.2 X10'3 (ref 1.8–7.7)
NEUTROPHILS NFR BLD AUTO: 80.7 % (ref 42–75)
PLATELET # BLD AUTO: 217 X10'3 (ref 140–440)
PMV BLD AUTO: 7.1 FL (ref 7.4–10.4)
POTASSIUM SERPL-SCNC: 3.8 MMOL/L (ref 3.5–5.1)
PROT SERPL-MCNC: 7.1 G/DL (ref 6.4–8.2)
RBC # BLD AUTO: 3.19 X10'6 (ref 4.7–6.1)
SODIUM SERPL-SCNC: 137 MMOL/L (ref 135–145)
WBC # BLD AUTO: 10.1 X10'3 (ref 4.5–11)

## 2019-12-05 PROCEDURE — 71045 X-RAY EXAM CHEST 1 VIEW: CPT

## 2019-12-05 PROCEDURE — 96374 THER/PROPH/DIAG INJ IV PUSH: CPT

## 2019-12-05 PROCEDURE — 80053 COMPREHEN METABOLIC PANEL: CPT

## 2019-12-05 PROCEDURE — 93005 ELECTROCARDIOGRAM TRACING: CPT

## 2019-12-05 PROCEDURE — 83880 ASSAY OF NATRIURETIC PEPTIDE: CPT

## 2019-12-05 PROCEDURE — 36415 COLL VENOUS BLD VENIPUNCTURE: CPT

## 2019-12-05 PROCEDURE — 99284 EMERGENCY DEPT VISIT MOD MDM: CPT

## 2019-12-05 PROCEDURE — 85025 COMPLETE CBC W/AUTO DIFF WBC: CPT

## 2019-12-05 PROCEDURE — 84484 ASSAY OF TROPONIN QUANT: CPT

## 2019-12-05 PROCEDURE — 83735 ASSAY OF MAGNESIUM: CPT

## 2020-02-17 ENCOUNTER — HOSPITAL ENCOUNTER (INPATIENT)
Dept: HOSPITAL 94 - ER | Age: 60
LOS: 9 days | Discharge: HOME | DRG: 73 | End: 2020-02-26
Attending: INTERNAL MEDICINE | Admitting: INTERNAL MEDICINE
Payer: COMMERCIAL

## 2020-02-17 VITALS — HEIGHT: 66 IN | BODY MASS INDEX: 27.38 KG/M2 | WEIGHT: 170.35 LBS

## 2020-02-17 VITALS — SYSTOLIC BLOOD PRESSURE: 132 MMHG | DIASTOLIC BLOOD PRESSURE: 74 MMHG

## 2020-02-17 VITALS — SYSTOLIC BLOOD PRESSURE: 158 MMHG | DIASTOLIC BLOOD PRESSURE: 63 MMHG

## 2020-02-17 VITALS — DIASTOLIC BLOOD PRESSURE: 76 MMHG | SYSTOLIC BLOOD PRESSURE: 183 MMHG

## 2020-02-17 DIAGNOSIS — Z82.3: ICD-10-CM

## 2020-02-17 DIAGNOSIS — E11.65: ICD-10-CM

## 2020-02-17 DIAGNOSIS — E11.22: ICD-10-CM

## 2020-02-17 DIAGNOSIS — E11.42: ICD-10-CM

## 2020-02-17 DIAGNOSIS — E78.00: ICD-10-CM

## 2020-02-17 DIAGNOSIS — Z82.49: ICD-10-CM

## 2020-02-17 DIAGNOSIS — Z86.73: ICD-10-CM

## 2020-02-17 DIAGNOSIS — E03.9: ICD-10-CM

## 2020-02-17 DIAGNOSIS — Z83.3: ICD-10-CM

## 2020-02-17 DIAGNOSIS — Z79.84: ICD-10-CM

## 2020-02-17 DIAGNOSIS — I13.2: ICD-10-CM

## 2020-02-17 DIAGNOSIS — G89.4: ICD-10-CM

## 2020-02-17 DIAGNOSIS — N18.6: ICD-10-CM

## 2020-02-17 DIAGNOSIS — E11.43: Primary | ICD-10-CM

## 2020-02-17 DIAGNOSIS — Z99.2: ICD-10-CM

## 2020-02-17 DIAGNOSIS — Z79.899: ICD-10-CM

## 2020-02-17 DIAGNOSIS — K31.84: ICD-10-CM

## 2020-02-17 DIAGNOSIS — I50.9: ICD-10-CM

## 2020-02-17 DIAGNOSIS — I48.0: ICD-10-CM

## 2020-02-17 LAB
ALBUMIN SERPL BCP-MCNC: 4.3 G/DL (ref 3.4–5)
ALBUMIN/GLOB SERPL: 1.1 {RATIO} (ref 1.1–1.5)
ALP SERPL-CCNC: 98 IU/L (ref 46–116)
ALT SERPL W P-5'-P-CCNC: 33 U/L (ref 12–78)
ANION GAP SERPL CALCULATED.3IONS-SCNC: 17 MMOL/L (ref 8–16)
AST SERPL W P-5'-P-CCNC: 29 U/L (ref 10–37)
BASOPHILS # BLD AUTO: 0.1 X10'3 (ref 0–0.2)
BASOPHILS NFR BLD AUTO: 0.4 % (ref 0–1)
BILIRUB SERPL-MCNC: 0.4 MG/DL (ref 0.1–1)
BUN SERPL-MCNC: 59 MG/DL (ref 7–18)
BUN/CREAT SERPL: 10.5 (ref 5.4–32)
CALCIUM SERPL-MCNC: 10.8 MG/DL (ref 8.5–10.1)
CHLORIDE SERPL-SCNC: 98 MMOL/L (ref 99–107)
CO2 SERPL-SCNC: 23.2 MMOL/L (ref 24–32)
CREAT SERPL-MCNC: 5.64 MG/DL (ref 0.6–1.1)
EOSINOPHIL # BLD AUTO: 0 X10'3 (ref 0–0.9)
EOSINOPHIL NFR BLD AUTO: 0.1 % (ref 0–6)
ERYTHROCYTE [DISTWIDTH] IN BLOOD BY AUTOMATED COUNT: 14.3 % (ref 11.5–14.5)
GFR SERPL CREATININE-BSD FRML MDRD: 10 ML/MIN
GLUCOSE SERPL-MCNC: 348 MG/DL (ref 70–104)
HBA1C MFR BLD: 7.2 % (ref 4.5–6.2)
HCT VFR BLD AUTO: 35.3 % (ref 42–52)
HGB BLD-MCNC: 11.8 G/DL (ref 14–17.9)
LYMPHOCYTES # BLD AUTO: 1.1 X10'3 (ref 1.1–4.8)
LYMPHOCYTES NFR BLD AUTO: 8.2 % (ref 21–51)
MCH RBC QN AUTO: 32 PG (ref 27–31)
MCHC RBC AUTO-ENTMCNC: 33.4 G/DL (ref 33–36.5)
MCV RBC AUTO: 95.7 FL (ref 78–98)
MONOCYTES # BLD AUTO: 0.4 X10'3 (ref 0–0.9)
MONOCYTES NFR BLD AUTO: 3.1 % (ref 2–12)
NEUTROPHILS # BLD AUTO: 11.8 X10'3 (ref 1.8–7.7)
NEUTROPHILS NFR BLD AUTO: 88.2 % (ref 42–75)
PLATELET # BLD AUTO: 264 X10'3 (ref 140–440)
PMV BLD AUTO: 6.7 FL (ref 7.4–10.4)
POTASSIUM SERPL-SCNC: 3.6 MMOL/L (ref 3.5–5.1)
PROT SERPL-MCNC: 8.3 G/DL (ref 6.4–8.2)
RBC # BLD AUTO: 3.69 X10'6 (ref 4.7–6.1)
SODIUM SERPL-SCNC: 138 MMOL/L (ref 135–145)
WBC # BLD AUTO: 13.3 X10'3 (ref 4.5–11)

## 2020-02-17 PROCEDURE — 85025 COMPLETE CBC W/AUTO DIFF WBC: CPT

## 2020-02-17 PROCEDURE — 99285 EMERGENCY DEPT VISIT HI MDM: CPT

## 2020-02-17 PROCEDURE — 97530 THERAPEUTIC ACTIVITIES: CPT

## 2020-02-17 PROCEDURE — 74018 RADEX ABDOMEN 1 VIEW: CPT

## 2020-02-17 PROCEDURE — 84443 ASSAY THYROID STIM HORMONE: CPT

## 2020-02-17 PROCEDURE — 76937 US GUIDE VASCULAR ACCESS: CPT

## 2020-02-17 PROCEDURE — 36415 COLL VENOUS BLD VENIPUNCTURE: CPT

## 2020-02-17 PROCEDURE — 80053 COMPREHEN METABOLIC PANEL: CPT

## 2020-02-17 PROCEDURE — 97110 THERAPEUTIC EXERCISES: CPT

## 2020-02-17 PROCEDURE — 83735 ASSAY OF MAGNESIUM: CPT

## 2020-02-17 PROCEDURE — 84100 ASSAY OF PHOSPHORUS: CPT

## 2020-02-17 PROCEDURE — 87081 CULTURE SCREEN ONLY: CPT

## 2020-02-17 PROCEDURE — 83036 HEMOGLOBIN GLYCOSYLATED A1C: CPT

## 2020-02-17 PROCEDURE — 82948 REAGENT STRIP/BLOOD GLUCOSE: CPT

## 2020-02-17 PROCEDURE — 93005 ELECTROCARDIOGRAM TRACING: CPT

## 2020-02-17 PROCEDURE — 97116 GAIT TRAINING THERAPY: CPT

## 2020-02-17 PROCEDURE — 97161 PT EVAL LOW COMPLEX 20 MIN: CPT

## 2020-02-17 RX ADMIN — INSULIN GLARGINE SCH UNIT: 100 INJECTION, SOLUTION SUBCUTANEOUS at 21:06

## 2020-02-17 RX ADMIN — INSULIN LISPRO SCH UNITS: 100 INJECTION, SOLUTION INTRAVENOUS; SUBCUTANEOUS at 19:27

## 2020-02-17 RX ADMIN — HYDRALAZINE HYDROCHLORIDE PRN MG: 20 INJECTION INTRAMUSCULAR; INTRAVENOUS at 19:28

## 2020-02-17 RX ADMIN — HEPARIN SODIUM SCH UNIT: 5000 INJECTION, SOLUTION INTRAVENOUS; SUBCUTANEOUS at 19:28

## 2020-02-17 RX ADMIN — CALCIUM ACETATE SCH CAP: 667 CAPSULE ORAL at 17:00

## 2020-02-17 RX ADMIN — Medication SCH EACH: at 17:20

## 2020-02-17 RX ADMIN — ONDANSETRON SCH MG: 4 TABLET, ORALLY DISINTEGRATING ORAL at 23:53

## 2020-02-17 RX ADMIN — ONDANSETRON PRN MG: 2 INJECTION, SOLUTION INTRAMUSCULAR; INTRAVENOUS at 16:06

## 2020-02-17 RX ADMIN — DOCUSATE SODIUM SCH MG: 100 CAPSULE, LIQUID FILLED ORAL at 19:17

## 2020-02-17 NOTE — NUR
Patient in room PCU 3026. I have received report from Sarita SPAULDING and had the opportunity to ask 
questions and assume patient care.

## 2020-02-17 NOTE — NUR
Problems reprioritized. Patient report given, questions answered & plan of care reviewed 
with Shawanda SPAULDING.

## 2020-02-17 NOTE — NUR
Notified Alonzo Newell of patient's blood pressure of 183/76. He said that he does not want 
it a whole lot lower than this but gave order for Hydralazine for SBP >175. Also obtained 
order for hyperglycemic protocol.

## 2020-02-17 NOTE — NUR
Patient very lethargic at this time. He is arousable and verbal but drowsy and difficult to 
understand.

## 2020-02-17 NOTE — NUR
Attempted to dart patient at this time but was unable to because he is extremely lethargic. 
He will only sustain eye opening briefly before falling back to sleep and he will mumble a 
few words at a time but is not oriented other than to self and is giving very minimal verbal 
responses.

## 2020-02-17 NOTE — NUR
Pt is still heaving and attempting to vomit. Pt reports pain 7/10 and 
persistent nausea. Provider to be notified of the nausea.

## 2020-02-17 NOTE — NUR
Patient in room ED 11. I have received report from Jenna SPAULDING  and had the opportunity to ask 
questions. Awaiting pts arrival to the unit.

## 2020-02-17 NOTE — NUR
Called Dr. Rubio to ask for hyper/hypoglycemia orders for this pt, he said he would enter 
them himself. Will continue to monitor the pt closely.

## 2020-02-18 VITALS — SYSTOLIC BLOOD PRESSURE: 137 MMHG | DIASTOLIC BLOOD PRESSURE: 69 MMHG

## 2020-02-18 VITALS — SYSTOLIC BLOOD PRESSURE: 158 MMHG | DIASTOLIC BLOOD PRESSURE: 69 MMHG

## 2020-02-18 VITALS — SYSTOLIC BLOOD PRESSURE: 126 MMHG | DIASTOLIC BLOOD PRESSURE: 50 MMHG

## 2020-02-18 VITALS — SYSTOLIC BLOOD PRESSURE: 129 MMHG | DIASTOLIC BLOOD PRESSURE: 76 MMHG

## 2020-02-18 VITALS — SYSTOLIC BLOOD PRESSURE: 156 MMHG | DIASTOLIC BLOOD PRESSURE: 60 MMHG

## 2020-02-18 VITALS — SYSTOLIC BLOOD PRESSURE: 108 MMHG | DIASTOLIC BLOOD PRESSURE: 79 MMHG

## 2020-02-18 LAB
ALBUMIN SERPL BCP-MCNC: 3.5 G/DL (ref 3.4–5)
ALBUMIN/GLOB SERPL: 1 {RATIO} (ref 1.1–1.5)
ALP SERPL-CCNC: 72 IU/L (ref 46–116)
ALT SERPL W P-5'-P-CCNC: 31 U/L (ref 12–78)
ANION GAP SERPL CALCULATED.3IONS-SCNC: 12 MMOL/L (ref 8–16)
AST SERPL W P-5'-P-CCNC: 28 U/L (ref 10–37)
BASOPHILS # BLD AUTO: 0 X10'3 (ref 0–0.2)
BASOPHILS NFR BLD AUTO: 0.2 % (ref 0–1)
BILIRUB SERPL-MCNC: 0.4 MG/DL (ref 0.1–1)
BUN SERPL-MCNC: 65 MG/DL (ref 7–18)
BUN/CREAT SERPL: 11.4 (ref 5.4–32)
CALCIUM SERPL-MCNC: 9.5 MG/DL (ref 8.5–10.1)
CHLORIDE SERPL-SCNC: 106 MMOL/L (ref 99–107)
CO2 SERPL-SCNC: 25.8 MMOL/L (ref 24–32)
CREAT SERPL-MCNC: 5.7 MG/DL (ref 0.6–1.1)
EOSINOPHIL # BLD AUTO: 0 X10'3 (ref 0–0.9)
EOSINOPHIL NFR BLD AUTO: 0.1 % (ref 0–6)
ERYTHROCYTE [DISTWIDTH] IN BLOOD BY AUTOMATED COUNT: 14.6 % (ref 11.5–14.5)
GFR SERPL CREATININE-BSD FRML MDRD: 10 ML/MIN
GLUCOSE SERPL-MCNC: 83 MG/DL (ref 70–104)
HCT VFR BLD AUTO: 30.8 % (ref 42–52)
HGB BLD-MCNC: 10.3 G/DL (ref 14–17.9)
LYMPHOCYTES # BLD AUTO: 1.1 X10'3 (ref 1.1–4.8)
LYMPHOCYTES NFR BLD AUTO: 7.9 % (ref 21–51)
MAGNESIUM SERPL-MCNC: 2 MG/DL (ref 1.5–2.4)
MCH RBC QN AUTO: 31.6 PG (ref 27–31)
MCHC RBC AUTO-ENTMCNC: 33.3 G/DL (ref 33–36.5)
MCV RBC AUTO: 94.9 FL (ref 78–98)
MONOCYTES # BLD AUTO: 0.9 X10'3 (ref 0–0.9)
MONOCYTES NFR BLD AUTO: 6.9 % (ref 2–12)
NEUTROPHILS # BLD AUTO: 11.5 X10'3 (ref 1.8–7.7)
NEUTROPHILS NFR BLD AUTO: 84.9 % (ref 42–75)
PHOSPHATE SERPL-MCNC: 4.4 MG/DL (ref 2.3–4.5)
PLATELET # BLD AUTO: 238 X10'3 (ref 140–440)
PMV BLD AUTO: 6.6 FL (ref 7.4–10.4)
POTASSIUM SERPL-SCNC: 2.9 MMOL/L (ref 3.5–5.1)
PROT SERPL-MCNC: 6.9 G/DL (ref 6.4–8.2)
RBC # BLD AUTO: 3.25 X10'6 (ref 4.7–6.1)
SODIUM SERPL-SCNC: 144 MMOL/L (ref 135–145)
WBC # BLD AUTO: 13.6 X10'3 (ref 4.5–11)

## 2020-02-18 PROCEDURE — 5A1D70Z PERFORMANCE OF URINARY FILTRATION, INTERMITTENT, LESS THAN 6 HOURS PER DAY: ICD-10-PCS | Performed by: INTERNAL MEDICINE

## 2020-02-18 RX ADMIN — POTASSIUM CHLORIDE SCH MLS/HR: 7.46 INJECTION, SOLUTION INTRAVENOUS at 14:25

## 2020-02-18 RX ADMIN — DOCUSATE SODIUM SCH MG: 100 CAPSULE, LIQUID FILLED ORAL at 20:55

## 2020-02-18 RX ADMIN — LEVOTHYROXINE SODIUM SCH MCG: 112 TABLET ORAL at 07:26

## 2020-02-18 RX ADMIN — POTASSIUM CHLORIDE SCH MLS/HR: 7.46 INJECTION, SOLUTION INTRAVENOUS at 15:34

## 2020-02-18 RX ADMIN — HEPARIN SODIUM SCH UNIT: 5000 INJECTION, SOLUTION INTRAVENOUS; SUBCUTANEOUS at 07:27

## 2020-02-18 RX ADMIN — LEVOTHYROXINE SODIUM SCH MCG: 25 TABLET ORAL at 07:25

## 2020-02-18 RX ADMIN — Medication SCH MG: at 07:25

## 2020-02-18 RX ADMIN — ONDANSETRON PRN MG: 2 INJECTION, SOLUTION INTRAMUSCULAR; INTRAVENOUS at 17:03

## 2020-02-18 RX ADMIN — POTASSIUM CHLORIDE SCH MLS/HR: 7.46 INJECTION, SOLUTION INTRAVENOUS at 12:12

## 2020-02-18 RX ADMIN — ONDANSETRON SCH MG: 4 TABLET, ORALLY DISINTEGRATING ORAL at 15:33

## 2020-02-18 RX ADMIN — HEPARIN SODIUM SCH UNIT: 5000 INJECTION, SOLUTION INTRAVENOUS; SUBCUTANEOUS at 20:56

## 2020-02-18 RX ADMIN — DOCUSATE SODIUM SCH MG: 100 CAPSULE, LIQUID FILLED ORAL at 08:00

## 2020-02-18 RX ADMIN — INSULIN GLARGINE SCH UNIT: 100 INJECTION, SOLUTION SUBCUTANEOUS at 21:00

## 2020-02-18 RX ADMIN — Medication SCH EACH: at 07:25

## 2020-02-18 RX ADMIN — ONDANSETRON SCH MG: 4 TABLET, ORALLY DISINTEGRATING ORAL at 07:24

## 2020-02-18 RX ADMIN — CALCIUM ACETATE SCH CAP: 667 CAPSULE ORAL at 07:25

## 2020-02-18 RX ADMIN — CALCIUM ACETATE SCH CAP: 667 CAPSULE ORAL at 17:03

## 2020-02-18 RX ADMIN — ONDANSETRON PRN MG: 2 INJECTION, SOLUTION INTRAMUSCULAR; INTRAVENOUS at 09:21

## 2020-02-18 RX ADMIN — CALCIUM ACETATE SCH CAP: 667 CAPSULE ORAL at 12:12

## 2020-02-18 RX ADMIN — PANTOPRAZOLE SODIUM SCH MG: 40 TABLET, DELAYED RELEASE ORAL at 07:25

## 2020-02-18 RX ADMIN — INSULIN LISPRO SCH UNITS: 100 INJECTION, SOLUTION INTRAVENOUS; SUBCUTANEOUS at 13:32

## 2020-02-18 RX ADMIN — INSULIN LISPRO SCH UNITS: 100 INJECTION, SOLUTION INTRAVENOUS; SUBCUTANEOUS at 18:50

## 2020-02-18 NOTE — NUR
Patient in room PCU 3026. I have received report from Codi SPAULDING   and had the opportunity to 
ask questions and assume patient care.

## 2020-02-18 NOTE — NUR
Dr. Siegel notified of potassium of 2.9, new orders noted to administer Potassium 40 meq 
IV X 1 dose. Pt. does not have a central line therefore, orders changed to administer 
potassium 10 meq IV X 4 doses X 1. First dose adminsitered at 0930.

## 2020-02-18 NOTE — NUR
Notified Dr. Newell of potassium of 2.9 , MD stated, "it's fine, we will just let it 
correct itself on it's own." MD was made aware of admitting Dx and plan for H.D. today.

## 2020-02-18 NOTE — NUR
Problems reprioritized. Patient report given, questions answered & plan of care reviewed 
with Carrie SPAULDING.

## 2020-02-18 NOTE — NUR
Patient is attempting to eat dinner, Pt. states, "Im feeling really good right now." 
Dialysis nurse at bedside. Problems reprioritized. Patient report given, questions answered 
& plan of care reviewed with Carmen SPAULDING.

## 2020-02-18 NOTE — NUR
Patient stated he was feeling well this am but did not eat r/t recent N/V, patient was 
medicated as ordered. Blood sugar 94 therefore, he was not covered for his blood sugar. Pt. 
is sleeping comfortably at this time.

## 2020-02-18 NOTE — NUR
Patient ate a few bites of his meal with his phoslo, patient was covered with insulin for 
his blood glucose only. Will continue to monitor.

## 2020-02-18 NOTE — NUR
Initial: Pt admit with intractable N/V with diabetic gastroparesis, 

current A1c is 7.2. Pt documented as A/O x 2 and confused, nutrition 

therapy education not appropriate at this time. Patient's A1c is down from 

7.9 in April of last year per records. Pt frequency with intractable N/V 

and N/V was unresponsive to medications on admit per H&P. Home medications 

include Metoclopramide, Zofran, PhosLo, and insulin. Pt currently on a 

renal CHO controlled diet documented with 0% PO intake at dinner last 

night, pending documentation of PO intake for today. Likely N/V will 

impact patient's PO intake. [facility] 2/17. Will continue to follow closely.

Recommendations:

1) Continue renal CHO controlled diet

2) Monitor need for ONS/additional protein to meet the demands of dialysis

3) DM and gastroparesis nutrition therapy education once alert and oriented

4) Bowel care PRN

5) Wt per rx

-------------------------------------------------------------------------------

Addendum: 02/18/20 at 1006 by Demetria Nielsen RD

-------------------------------------------------------------------------------

Amended: Links added.

## 2020-02-19 VITALS — DIASTOLIC BLOOD PRESSURE: 59 MMHG | SYSTOLIC BLOOD PRESSURE: 116 MMHG

## 2020-02-19 VITALS — SYSTOLIC BLOOD PRESSURE: 152 MMHG | DIASTOLIC BLOOD PRESSURE: 66 MMHG

## 2020-02-19 VITALS — DIASTOLIC BLOOD PRESSURE: 64 MMHG | SYSTOLIC BLOOD PRESSURE: 150 MMHG

## 2020-02-19 VITALS — DIASTOLIC BLOOD PRESSURE: 65 MMHG | SYSTOLIC BLOOD PRESSURE: 131 MMHG

## 2020-02-19 VITALS — SYSTOLIC BLOOD PRESSURE: 63 MMHG | DIASTOLIC BLOOD PRESSURE: 42 MMHG

## 2020-02-19 VITALS — SYSTOLIC BLOOD PRESSURE: 104 MMHG | DIASTOLIC BLOOD PRESSURE: 70 MMHG

## 2020-02-19 VITALS — SYSTOLIC BLOOD PRESSURE: 178 MMHG | DIASTOLIC BLOOD PRESSURE: 76 MMHG

## 2020-02-19 VITALS — SYSTOLIC BLOOD PRESSURE: 67 MMHG | DIASTOLIC BLOOD PRESSURE: 40 MMHG

## 2020-02-19 VITALS — SYSTOLIC BLOOD PRESSURE: 160 MMHG | DIASTOLIC BLOOD PRESSURE: 75 MMHG

## 2020-02-19 VITALS — DIASTOLIC BLOOD PRESSURE: 68 MMHG | SYSTOLIC BLOOD PRESSURE: 135 MMHG

## 2020-02-19 VITALS — SYSTOLIC BLOOD PRESSURE: 124 MMHG | DIASTOLIC BLOOD PRESSURE: 78 MMHG

## 2020-02-19 VITALS — DIASTOLIC BLOOD PRESSURE: 66 MMHG | SYSTOLIC BLOOD PRESSURE: 178 MMHG

## 2020-02-19 VITALS — DIASTOLIC BLOOD PRESSURE: 72 MMHG | SYSTOLIC BLOOD PRESSURE: 136 MMHG

## 2020-02-19 LAB
ALBUMIN SERPL BCP-MCNC: 3.8 G/DL (ref 3.4–5)
ALBUMIN/GLOB SERPL: 1.1 {RATIO} (ref 1.1–1.5)
ALP SERPL-CCNC: 75 IU/L (ref 46–116)
ALT SERPL W P-5'-P-CCNC: 31 U/L (ref 12–78)
ANION GAP SERPL CALCULATED.3IONS-SCNC: 19 MMOL/L (ref 8–16)
AST SERPL W P-5'-P-CCNC: 33 U/L (ref 10–37)
BASOPHILS # BLD AUTO: 0 X10'3 (ref 0–0.2)
BASOPHILS NFR BLD AUTO: 0.4 % (ref 0–1)
BILIRUB SERPL-MCNC: 0.6 MG/DL (ref 0.1–1)
BUN SERPL-MCNC: 27 MG/DL (ref 7–18)
BUN/CREAT SERPL: 7.2 (ref 5.4–32)
CALCIUM SERPL-MCNC: 9.7 MG/DL (ref 8.5–10.1)
CHLORIDE SERPL-SCNC: 100 MMOL/L (ref 99–107)
CO2 SERPL-SCNC: 22.4 MMOL/L (ref 24–32)
CREAT SERPL-MCNC: 3.75 MG/DL (ref 0.6–1.1)
EOSINOPHIL # BLD AUTO: 0 X10'3 (ref 0–0.9)
EOSINOPHIL NFR BLD AUTO: 0.1 % (ref 0–6)
ERYTHROCYTE [DISTWIDTH] IN BLOOD BY AUTOMATED COUNT: 14.6 % (ref 11.5–14.5)
GFR SERPL CREATININE-BSD FRML MDRD: 17 ML/MIN
GLUCOSE SERPL-MCNC: 198 MG/DL (ref 70–104)
HCT VFR BLD AUTO: 33.4 % (ref 42–52)
HGB BLD-MCNC: 11.1 G/DL (ref 14–17.9)
LYMPHOCYTES # BLD AUTO: 0.9 X10'3 (ref 1.1–4.8)
LYMPHOCYTES NFR BLD AUTO: 11.9 % (ref 21–51)
MAGNESIUM SERPL-MCNC: 1.9 MG/DL (ref 1.5–2.4)
MCH RBC QN AUTO: 31.8 PG (ref 27–31)
MCHC RBC AUTO-ENTMCNC: 33.3 G/DL (ref 33–36.5)
MCV RBC AUTO: 95.4 FL (ref 78–98)
MONOCYTES # BLD AUTO: 0.5 X10'3 (ref 0–0.9)
MONOCYTES NFR BLD AUTO: 6.4 % (ref 2–12)
NEUTROPHILS # BLD AUTO: 6.2 X10'3 (ref 1.8–7.7)
NEUTROPHILS NFR BLD AUTO: 81.2 % (ref 42–75)
PHOSPHATE SERPL-MCNC: 2.7 MG/DL (ref 2.3–4.5)
PLATELET # BLD AUTO: 203 X10'3 (ref 140–440)
PMV BLD AUTO: 7.3 FL (ref 7.4–10.4)
POTASSIUM SERPL-SCNC: 3.4 MMOL/L (ref 3.5–5.1)
PROT SERPL-MCNC: 7.3 G/DL (ref 6.4–8.2)
RBC # BLD AUTO: 3.5 X10'6 (ref 4.7–6.1)
SODIUM SERPL-SCNC: 141 MMOL/L (ref 135–145)
WBC # BLD AUTO: 7.6 X10'3 (ref 4.5–11)

## 2020-02-19 RX ADMIN — HYDRALAZINE HYDROCHLORIDE PRN MG: 20 INJECTION INTRAMUSCULAR; INTRAVENOUS at 13:07

## 2020-02-19 RX ADMIN — CALCIUM ACETATE SCH CAP: 667 CAPSULE ORAL at 07:57

## 2020-02-19 RX ADMIN — AMIODARONE HYDROCHLORIDE SCH MLS/HR: 1.8 INJECTION, SOLUTION INTRAVENOUS at 13:07

## 2020-02-19 RX ADMIN — Medication SCH MG: at 07:54

## 2020-02-19 RX ADMIN — CALCIUM ACETATE SCH CAP: 667 CAPSULE ORAL at 12:43

## 2020-02-19 RX ADMIN — ONDANSETRON SCH MG: 4 TABLET, ORALLY DISINTEGRATING ORAL at 07:53

## 2020-02-19 RX ADMIN — ONDANSETRON PRN MG: 2 INJECTION, SOLUTION INTRAMUSCULAR; INTRAVENOUS at 14:41

## 2020-02-19 RX ADMIN — AMIODARONE HYDROCHLORIDE SCH MLS/HR: 1.8 INJECTION, SOLUTION INTRAVENOUS at 05:49

## 2020-02-19 RX ADMIN — DOCUSATE SODIUM SCH MG: 100 CAPSULE, LIQUID FILLED ORAL at 08:00

## 2020-02-19 RX ADMIN — AMIODARONE HYDROCHLORIDE SCH MLS/HR: 1.8 INJECTION, SOLUTION INTRAVENOUS at 12:43

## 2020-02-19 RX ADMIN — HEPARIN SODIUM SCH UNIT: 5000 INJECTION, SOLUTION INTRAVENOUS; SUBCUTANEOUS at 09:09

## 2020-02-19 RX ADMIN — INSULIN GLARGINE SCH UNIT: 100 INJECTION, SOLUTION SUBCUTANEOUS at 21:00

## 2020-02-19 RX ADMIN — LEVOTHYROXINE SODIUM SCH MCG: 25 TABLET ORAL at 07:52

## 2020-02-19 RX ADMIN — INSULIN LISPRO SCH UNITS: 100 INJECTION, SOLUTION INTRAVENOUS; SUBCUTANEOUS at 13:17

## 2020-02-19 RX ADMIN — ONDANSETRON PRN MG: 2 INJECTION, SOLUTION INTRAMUSCULAR; INTRAVENOUS at 09:09

## 2020-02-19 RX ADMIN — ONDANSETRON PRN MG: 2 INJECTION, SOLUTION INTRAMUSCULAR; INTRAVENOUS at 01:58

## 2020-02-19 RX ADMIN — ONDANSETRON SCH MG: 4 TABLET, ORALLY DISINTEGRATING ORAL at 00:55

## 2020-02-19 RX ADMIN — HEPARIN SODIUM SCH UNIT: 5000 INJECTION, SOLUTION INTRAVENOUS; SUBCUTANEOUS at 20:00

## 2020-02-19 RX ADMIN — DOCUSATE SODIUM SCH MG: 100 CAPSULE, LIQUID FILLED ORAL at 20:00

## 2020-02-19 RX ADMIN — PANTOPRAZOLE SODIUM SCH MG: 40 TABLET, DELAYED RELEASE ORAL at 07:52

## 2020-02-19 RX ADMIN — INSULIN LISPRO SCH UNITS: 100 INJECTION, SOLUTION INTRAVENOUS; SUBCUTANEOUS at 08:33

## 2020-02-19 RX ADMIN — INSULIN LISPRO SCH UNITS: 100 INJECTION, SOLUTION INTRAVENOUS; SUBCUTANEOUS at 19:21

## 2020-02-19 RX ADMIN — LEVOTHYROXINE SODIUM SCH MCG: 112 TABLET ORAL at 07:52

## 2020-02-19 RX ADMIN — ONDANSETRON SCH MG: 4 TABLET, ORALLY DISINTEGRATING ORAL at 16:59

## 2020-02-19 RX ADMIN — CALCIUM ACETATE SCH CAP: 667 CAPSULE ORAL at 16:59

## 2020-02-19 RX ADMIN — Medication SCH EACH: at 07:53

## 2020-02-19 NOTE — NUR
Since Amiodarone was discontinued, the patient has bursts of a-fib with RVR,  pt. is 
currently in sinus rhythm. Patient is asymptomatic. MD stated, "No further orders."

## 2020-02-19 NOTE — NUR
Received orders from Robbin DIA to discontinue Hydralazine 10 mg IV push Q4h PRN and to 
discontinue Amiodarone drip. Informed of improved potassium level at 3.4 today; Robbin 
states he plans to let this further improve on its own. Made aware of hypertensive episode 
today of 178/78 and give of Hydralazine 10mg push which brought BP down to 124/74 and 
Robbin ordered to DC hydralazine and let BP stabilize with continued use of home 
medications Lisinopril, amlodipine, and furosemide. Informed that patient was started on 
Amiodarone drip for episode of Afib c RVR last night and has now converted back to sinus 
rhythm; received orders to DC drip. Orders executed. Primary Nurse made aware.

## 2020-02-19 NOTE — NUR
Patient sitting on edge of bed and surprisingly looks much better from prior assessment. Pt. 
is currently in sinus rhythm and blood pressure is stable at this time. Problems 
reprioritized. Patient report given, questions answered & plan of care reviewed with amber SPAULDING.

## 2020-02-19 NOTE — NUR
Patient in room PCU 3026. I have received report from Carmen SPAULDING  and had the opportunity to 
ask questions and assume patient care.

## 2020-02-19 NOTE — NUR
Dr. britton also notified about patients persistent nausea and poor PO intake. MD would 
like Ativan to be utilized depsite his drowsiness. MD stated he may also increase his dose 
of Zofran. PRN hydralazine/amio DC'd per MD's request. Patient is currently in Sinus rhythm. 
Will continue to monitor patient closely. Incouraged PO intake, pt. agreeable to a Popsicle, 
declined all other suggestions.

## 2020-02-19 NOTE — NUR
Patient has intermittent vomiting, PRN medications given and effective for short periods of 
time, MD increased Zofran. Phoslo held r/t vomiting/not eating. MD is aware.

## 2020-02-20 VITALS — SYSTOLIC BLOOD PRESSURE: 127 MMHG | DIASTOLIC BLOOD PRESSURE: 47 MMHG

## 2020-02-20 VITALS — SYSTOLIC BLOOD PRESSURE: 119 MMHG | DIASTOLIC BLOOD PRESSURE: 52 MMHG

## 2020-02-20 VITALS — DIASTOLIC BLOOD PRESSURE: 47 MMHG | SYSTOLIC BLOOD PRESSURE: 100 MMHG

## 2020-02-20 VITALS — DIASTOLIC BLOOD PRESSURE: 68 MMHG | SYSTOLIC BLOOD PRESSURE: 82 MMHG

## 2020-02-20 VITALS — SYSTOLIC BLOOD PRESSURE: 100 MMHG | DIASTOLIC BLOOD PRESSURE: 49 MMHG

## 2020-02-20 VITALS — DIASTOLIC BLOOD PRESSURE: 50 MMHG | SYSTOLIC BLOOD PRESSURE: 93 MMHG

## 2020-02-20 VITALS — SYSTOLIC BLOOD PRESSURE: 105 MMHG | DIASTOLIC BLOOD PRESSURE: 46 MMHG

## 2020-02-20 VITALS — SYSTOLIC BLOOD PRESSURE: 96 MMHG | DIASTOLIC BLOOD PRESSURE: 47 MMHG

## 2020-02-20 VITALS — DIASTOLIC BLOOD PRESSURE: 54 MMHG | SYSTOLIC BLOOD PRESSURE: 111 MMHG

## 2020-02-20 VITALS — DIASTOLIC BLOOD PRESSURE: 69 MMHG | SYSTOLIC BLOOD PRESSURE: 116 MMHG

## 2020-02-20 VITALS — SYSTOLIC BLOOD PRESSURE: 128 MMHG | DIASTOLIC BLOOD PRESSURE: 58 MMHG

## 2020-02-20 LAB
ALBUMIN SERPL BCP-MCNC: 4.1 G/DL (ref 3.4–5)
ALBUMIN/GLOB SERPL: 1.1 {RATIO} (ref 1.1–1.5)
ALP SERPL-CCNC: 83 IU/L (ref 46–116)
ALT SERPL W P-5'-P-CCNC: 35 U/L (ref 12–78)
ANION GAP SERPL CALCULATED.3IONS-SCNC: 22 MMOL/L (ref 8–16)
AST SERPL W P-5'-P-CCNC: 42 U/L (ref 10–37)
BASOPHILS # BLD AUTO: 0 X10'3 (ref 0–0.2)
BASOPHILS NFR BLD AUTO: 0.3 % (ref 0–1)
BILIRUB SERPL-MCNC: 0.7 MG/DL (ref 0.1–1)
BUN SERPL-MCNC: 45 MG/DL (ref 7–18)
BUN/CREAT SERPL: 7 (ref 5.4–32)
CALCIUM SERPL-MCNC: 10.4 MG/DL (ref 8.5–10.1)
CHLORIDE SERPL-SCNC: 96 MMOL/L (ref 99–107)
CO2 SERPL-SCNC: 26.7 MMOL/L (ref 24–32)
CREAT SERPL-MCNC: 6.47 MG/DL (ref 0.6–1.1)
EOSINOPHIL # BLD AUTO: 0 X10'3 (ref 0–0.9)
EOSINOPHIL NFR BLD AUTO: 0 % (ref 0–6)
ERYTHROCYTE [DISTWIDTH] IN BLOOD BY AUTOMATED COUNT: 14.2 % (ref 11.5–14.5)
GFR SERPL CREATININE-BSD FRML MDRD: 9 ML/MIN
GLUCOSE SERPL-MCNC: 197 MG/DL (ref 70–104)
HCT VFR BLD AUTO: 37.6 % (ref 42–52)
HGB BLD-MCNC: 12.6 G/DL (ref 14–17.9)
LYMPHOCYTES # BLD AUTO: 1.6 X10'3 (ref 1.1–4.8)
LYMPHOCYTES NFR BLD AUTO: 11.4 % (ref 21–51)
MAGNESIUM SERPL-MCNC: 2.2 MG/DL (ref 1.5–2.4)
MCH RBC QN AUTO: 32.1 PG (ref 27–31)
MCHC RBC AUTO-ENTMCNC: 33.5 G/DL (ref 33–36.5)
MCV RBC AUTO: 95.9 FL (ref 78–98)
MONOCYTES # BLD AUTO: 1 X10'3 (ref 0–0.9)
MONOCYTES NFR BLD AUTO: 7.1 % (ref 2–12)
NEUTROPHILS # BLD AUTO: 11 X10'3 (ref 1.8–7.7)
NEUTROPHILS NFR BLD AUTO: 81.2 % (ref 42–75)
PHOSPHATE SERPL-MCNC: 4.3 MG/DL (ref 2.3–4.5)
PLATELET # BLD AUTO: 233 X10'3 (ref 140–440)
PMV BLD AUTO: 7.1 FL (ref 7.4–10.4)
POTASSIUM SERPL-SCNC: 3.2 MMOL/L (ref 3.5–5.1)
PROT SERPL-MCNC: 8 G/DL (ref 6.4–8.2)
RBC # BLD AUTO: 3.92 X10'6 (ref 4.7–6.1)
SODIUM SERPL-SCNC: 145 MMOL/L (ref 135–145)
WBC # BLD AUTO: 13.6 X10'3 (ref 4.5–11)

## 2020-02-20 PROCEDURE — 5A1D70Z PERFORMANCE OF URINARY FILTRATION, INTERMITTENT, LESS THAN 6 HOURS PER DAY: ICD-10-PCS | Performed by: INTERNAL MEDICINE

## 2020-02-20 RX ADMIN — HEPARIN SODIUM SCH UNIT: 5000 INJECTION, SOLUTION INTRAVENOUS; SUBCUTANEOUS at 07:45

## 2020-02-20 RX ADMIN — ONDANSETRON SCH MG: 4 TABLET, ORALLY DISINTEGRATING ORAL at 00:48

## 2020-02-20 RX ADMIN — LEVOTHYROXINE SODIUM SCH MCG: 25 TABLET ORAL at 07:42

## 2020-02-20 RX ADMIN — CALCIUM ACETATE SCH CAP: 667 CAPSULE ORAL at 12:00

## 2020-02-20 RX ADMIN — AMIODARONE HYDROCHLORIDE SCH MLS/HR: 1.8 INJECTION, SOLUTION INTRAVENOUS at 21:31

## 2020-02-20 RX ADMIN — Medication SCH EACH: at 07:43

## 2020-02-20 RX ADMIN — INSULIN LISPRO SCH UNITS: 100 INJECTION, SOLUTION INTRAVENOUS; SUBCUTANEOUS at 09:58

## 2020-02-20 RX ADMIN — ONDANSETRON PRN MG: 2 INJECTION, SOLUTION INTRAMUSCULAR; INTRAVENOUS at 10:37

## 2020-02-20 RX ADMIN — ONDANSETRON SCH MG: 4 TABLET, ORALLY DISINTEGRATING ORAL at 07:44

## 2020-02-20 RX ADMIN — METOCLOPRAMIDE SCH MG: 5 INJECTION, SOLUTION INTRAMUSCULAR; INTRAVENOUS at 14:19

## 2020-02-20 RX ADMIN — DOCUSATE SODIUM SCH MG: 100 CAPSULE, LIQUID FILLED ORAL at 07:44

## 2020-02-20 RX ADMIN — DOCUSATE SODIUM SCH MG: 100 CAPSULE, LIQUID FILLED ORAL at 20:26

## 2020-02-20 RX ADMIN — CALCIUM ACETATE SCH CAP: 667 CAPSULE ORAL at 07:00

## 2020-02-20 RX ADMIN — CALCIUM ACETATE SCH CAP: 667 CAPSULE ORAL at 17:00

## 2020-02-20 RX ADMIN — METOCLOPRAMIDE SCH MG: 5 INJECTION, SOLUTION INTRAMUSCULAR; INTRAVENOUS at 18:00

## 2020-02-20 RX ADMIN — HEPARIN SODIUM SCH UNIT: 5000 INJECTION, SOLUTION INTRAVENOUS; SUBCUTANEOUS at 20:31

## 2020-02-20 RX ADMIN — ONDANSETRON SCH MG: 4 TABLET, ORALLY DISINTEGRATING ORAL at 14:19

## 2020-02-20 RX ADMIN — ONDANSETRON SCH MG: 4 TABLET, ORALLY DISINTEGRATING ORAL at 18:00

## 2020-02-20 RX ADMIN — PANTOPRAZOLE SODIUM SCH MG: 40 TABLET, DELAYED RELEASE ORAL at 07:44

## 2020-02-20 RX ADMIN — ONDANSETRON SCH MG: 4 TABLET, ORALLY DISINTEGRATING ORAL at 20:27

## 2020-02-20 RX ADMIN — INSULIN GLARGINE SCH UNIT: 100 INJECTION, SOLUTION SUBCUTANEOUS at 21:00

## 2020-02-20 RX ADMIN — METOCLOPRAMIDE SCH MG: 5 INJECTION, SOLUTION INTRAMUSCULAR; INTRAVENOUS at 20:30

## 2020-02-20 RX ADMIN — Medication SCH MG: at 07:45

## 2020-02-20 RX ADMIN — LEVOTHYROXINE SODIUM SCH MCG: 112 TABLET ORAL at 07:42

## 2020-02-20 NOTE — NUR
PT HR UP AGAIN THIS MORNING WITH DECREASE OF SBP--ENCOURAGED PT TO DEEP BREATHE MD IS AWARE 
OF THIS OCCURRENCE YESTERDAY ON NIGHTS AND DAYS--PER DR. MALONEY YESTERDAY NO TREATMENT 
--WLLL BE TAKEN CARE OF WITH DIALYSIS

## 2020-02-20 NOTE — NUR
Patient converted to Aflutter with RVR with HR in the 150s with bursts as high as the 
180s-190s. Patient remained very lethargic and was lying still in bed. Patient's BP was 
102/76 and went down to 95/73. Notified on call intensivist NP January of current HR and BP 
and patient's history including that he was on an Amiodarone drip a couple of days ago but 
it was d/c'd and he was not placed on any PO. New order received to restart patient on Amio 
drip with bolus. Went in to administer Amiodarone but patient's IV was leaking and very 
difficult to flush. New IV was initiated and orders carried out.

## 2020-02-20 NOTE — NUR
Patient in room PCU 3026. I have received report from JASSON Morales and had the opportunity to 
ask questions and assume patient care.

## 2020-02-20 NOTE — NUR
Problems reprioritized. Patient report given, questions answered & plan of care reviewed 
with JASSON Leblanc.

## 2020-02-20 NOTE — NUR
DM/gastroparesis education: Pt currently on zofran however still with 

intractable N/V. Per MD note, zofran to be changed to IV reglan. RD intern 

visited pt at bedside to provide written and verbal gastroparesis, low 

fiber, and DM education with referral to CDE course and RD contact 

information. Pt currently documented with A1c of 7.2. Pt reports A1c was 

6.5 two weeks ago, currently on gliperide and januvia and checks BG 

regularly 4 times a day. Pt reports no appetite d/t intractable vomiting 

and denies any ONS or food preferences at this time besides grind all and 

chicken broth, d/w dietary. Pt reports difficulty chewing d/t being 

edentulous, constipation and denies any prunes/prune juice/power pudding 

because he will not be able to keep it down. [facility] 2/17. Will continue to 

monitor.

Recommendations:

1) Continue renal CHO controlled, advance diet to low residue as medically 

indicated

2) Monitor need for ONS/additional protein once tolerating PO intake

3) Grind all per pt preference

4) Bowel care PRN; promotility per MD

5) wts w/ HD

-------------------------------------------------------------------------------

Addendum: 02/20/20 at 1558 by Wing Bryon HARTLEY

-------------------------------------------------------------------------------

Amended: Links added.

-------------------------------------------------------------------------------

Addendum: 02/20/20 at 1559 by Ariel Caruso RD

-------------------------------------------------------------------------------

NAEEM Plata

## 2020-02-21 VITALS — SYSTOLIC BLOOD PRESSURE: 119 MMHG | DIASTOLIC BLOOD PRESSURE: 72 MMHG

## 2020-02-21 VITALS — SYSTOLIC BLOOD PRESSURE: 102 MMHG | DIASTOLIC BLOOD PRESSURE: 58 MMHG

## 2020-02-21 VITALS — DIASTOLIC BLOOD PRESSURE: 67 MMHG | SYSTOLIC BLOOD PRESSURE: 131 MMHG

## 2020-02-21 VITALS — SYSTOLIC BLOOD PRESSURE: 110 MMHG | DIASTOLIC BLOOD PRESSURE: 84 MMHG

## 2020-02-21 VITALS — DIASTOLIC BLOOD PRESSURE: 53 MMHG | SYSTOLIC BLOOD PRESSURE: 115 MMHG

## 2020-02-21 VITALS — DIASTOLIC BLOOD PRESSURE: 57 MMHG | SYSTOLIC BLOOD PRESSURE: 127 MMHG

## 2020-02-21 VITALS — DIASTOLIC BLOOD PRESSURE: 49 MMHG | SYSTOLIC BLOOD PRESSURE: 105 MMHG

## 2020-02-21 VITALS — SYSTOLIC BLOOD PRESSURE: 96 MMHG | DIASTOLIC BLOOD PRESSURE: 46 MMHG

## 2020-02-21 VITALS — DIASTOLIC BLOOD PRESSURE: 37 MMHG | SYSTOLIC BLOOD PRESSURE: 81 MMHG

## 2020-02-21 VITALS — DIASTOLIC BLOOD PRESSURE: 63 MMHG | SYSTOLIC BLOOD PRESSURE: 99 MMHG

## 2020-02-21 VITALS — SYSTOLIC BLOOD PRESSURE: 126 MMHG | DIASTOLIC BLOOD PRESSURE: 57 MMHG

## 2020-02-21 VITALS — DIASTOLIC BLOOD PRESSURE: 53 MMHG | SYSTOLIC BLOOD PRESSURE: 122 MMHG

## 2020-02-21 VITALS — DIASTOLIC BLOOD PRESSURE: 60 MMHG | SYSTOLIC BLOOD PRESSURE: 132 MMHG

## 2020-02-21 VITALS — DIASTOLIC BLOOD PRESSURE: 79 MMHG | SYSTOLIC BLOOD PRESSURE: 121 MMHG

## 2020-02-21 VITALS — DIASTOLIC BLOOD PRESSURE: 55 MMHG | SYSTOLIC BLOOD PRESSURE: 90 MMHG

## 2020-02-21 VITALS — SYSTOLIC BLOOD PRESSURE: 104 MMHG | DIASTOLIC BLOOD PRESSURE: 46 MMHG

## 2020-02-21 LAB
ALBUMIN SERPL BCP-MCNC: 4.1 G/DL (ref 3.4–5)
ALBUMIN/GLOB SERPL: 0.9 {RATIO} (ref 1.1–1.5)
ALP SERPL-CCNC: 95 IU/L (ref 46–116)
ALT SERPL W P-5'-P-CCNC: 36 U/L (ref 12–78)
ANION GAP SERPL CALCULATED.3IONS-SCNC: 18 MMOL/L (ref 8–16)
AST SERPL W P-5'-P-CCNC: 38 U/L (ref 10–37)
BASOPHILS # BLD AUTO: 0 X10'3 (ref 0–0.2)
BASOPHILS NFR BLD AUTO: 0.2 % (ref 0–1)
BILIRUB SERPL-MCNC: 0.8 MG/DL (ref 0.1–1)
BUN SERPL-MCNC: 31 MG/DL (ref 7–18)
BUN/CREAT SERPL: 5.9 (ref 5.4–32)
CALCIUM SERPL-MCNC: 10.1 MG/DL (ref 8.5–10.1)
CHLORIDE SERPL-SCNC: 94 MMOL/L (ref 99–107)
CO2 SERPL-SCNC: 26.2 MMOL/L (ref 24–32)
CREAT SERPL-MCNC: 5.24 MG/DL (ref 0.6–1.1)
EOSINOPHIL # BLD AUTO: 0 X10'3 (ref 0–0.9)
EOSINOPHIL NFR BLD AUTO: 0.1 % (ref 0–6)
ERYTHROCYTE [DISTWIDTH] IN BLOOD BY AUTOMATED COUNT: 14.3 % (ref 11.5–14.5)
GFR SERPL CREATININE-BSD FRML MDRD: 11 ML/MIN
GLUCOSE SERPL-MCNC: 207 MG/DL (ref 70–104)
HCT VFR BLD AUTO: 37.2 % (ref 42–52)
HGB BLD-MCNC: 12.4 G/DL (ref 14–17.9)
LYMPHOCYTES # BLD AUTO: 1.3 X10'3 (ref 1.1–4.8)
LYMPHOCYTES NFR BLD AUTO: 11.5 % (ref 21–51)
MAGNESIUM SERPL-MCNC: 2.1 MG/DL (ref 1.5–2.4)
MCH RBC QN AUTO: 31.4 PG (ref 27–31)
MCHC RBC AUTO-ENTMCNC: 33.3 G/DL (ref 33–36.5)
MCV RBC AUTO: 94.1 FL (ref 78–98)
MONOCYTES # BLD AUTO: 1.1 X10'3 (ref 0–0.9)
MONOCYTES NFR BLD AUTO: 9.6 % (ref 2–12)
NEUTROPHILS # BLD AUTO: 9.2 X10'3 (ref 1.8–7.7)
NEUTROPHILS NFR BLD AUTO: 78.6 % (ref 42–75)
PHOSPHATE SERPL-MCNC: 4.8 MG/DL (ref 2.3–4.5)
PLATELET # BLD AUTO: 243 X10'3 (ref 140–440)
PMV BLD AUTO: 7 FL (ref 7.4–10.4)
POTASSIUM SERPL-SCNC: 4 MMOL/L (ref 3.5–5.1)
PROT SERPL-MCNC: 8.5 G/DL (ref 6.4–8.2)
RBC # BLD AUTO: 3.95 X10'6 (ref 4.7–6.1)
SODIUM SERPL-SCNC: 138 MMOL/L (ref 135–145)
WBC # BLD AUTO: 11.7 X10'3 (ref 4.5–11)

## 2020-02-21 RX ADMIN — AMIODARONE HYDROCHLORIDE SCH MLS/HR: 1.8 INJECTION, SOLUTION INTRAVENOUS at 03:46

## 2020-02-21 RX ADMIN — INSULIN LISPRO SCH UNITS: 100 INJECTION, SOLUTION INTRAVENOUS; SUBCUTANEOUS at 08:07

## 2020-02-21 RX ADMIN — CALCIUM ACETATE SCH CAP: 667 CAPSULE ORAL at 17:00

## 2020-02-21 RX ADMIN — METOCLOPRAMIDE SCH MG: 5 INJECTION, SOLUTION INTRAMUSCULAR; INTRAVENOUS at 02:19

## 2020-02-21 RX ADMIN — ONDANSETRON SCH MG: 4 TABLET, ORALLY DISINTEGRATING ORAL at 14:44

## 2020-02-21 RX ADMIN — HEPARIN SODIUM SCH UNIT: 5000 INJECTION, SOLUTION INTRAVENOUS; SUBCUTANEOUS at 19:53

## 2020-02-21 RX ADMIN — METOCLOPRAMIDE SCH MG: 5 INJECTION, SOLUTION INTRAMUSCULAR; INTRAVENOUS at 14:44

## 2020-02-21 RX ADMIN — INSULIN LISPRO SCH UNITS: 100 INJECTION, SOLUTION INTRAVENOUS; SUBCUTANEOUS at 18:42

## 2020-02-21 RX ADMIN — DOCUSATE SODIUM SCH MG: 100 CAPSULE, LIQUID FILLED ORAL at 07:56

## 2020-02-21 RX ADMIN — CALCIUM ACETATE SCH CAP: 667 CAPSULE ORAL at 12:00

## 2020-02-21 RX ADMIN — ONDANSETRON SCH MG: 4 TABLET, ORALLY DISINTEGRATING ORAL at 02:18

## 2020-02-21 RX ADMIN — LEVOTHYROXINE SODIUM SCH MCG: 25 TABLET ORAL at 07:55

## 2020-02-21 RX ADMIN — PANTOPRAZOLE SODIUM SCH MG: 40 TABLET, DELAYED RELEASE ORAL at 07:55

## 2020-02-21 RX ADMIN — AMIODARONE HYDROCHLORIDE SCH MLS/HR: 1.8 INJECTION, SOLUTION INTRAVENOUS at 15:05

## 2020-02-21 RX ADMIN — INSULIN GLARGINE SCH UNIT: 100 INJECTION, SOLUTION SUBCUTANEOUS at 21:00

## 2020-02-21 RX ADMIN — ONDANSETRON SCH MG: 4 TABLET, ORALLY DISINTEGRATING ORAL at 07:55

## 2020-02-21 RX ADMIN — METOCLOPRAMIDE SCH MG: 5 INJECTION, SOLUTION INTRAMUSCULAR; INTRAVENOUS at 07:52

## 2020-02-21 RX ADMIN — HEPARIN SODIUM SCH UNIT: 5000 INJECTION, SOLUTION INTRAVENOUS; SUBCUTANEOUS at 07:54

## 2020-02-21 RX ADMIN — DOCUSATE SODIUM SCH MG: 100 CAPSULE, LIQUID FILLED ORAL at 19:53

## 2020-02-21 RX ADMIN — Medication SCH EACH: at 07:52

## 2020-02-21 RX ADMIN — AMIODARONE HYDROCHLORIDE SCH MLS/HR: 1.8 INJECTION, SOLUTION INTRAVENOUS at 08:55

## 2020-02-21 RX ADMIN — AMIODARONE HYDROCHLORIDE SCH MLS/HR: 1.8 INJECTION, SOLUTION INTRAVENOUS at 21:03

## 2020-02-21 RX ADMIN — CALCIUM ACETATE SCH CAP: 667 CAPSULE ORAL at 07:56

## 2020-02-21 RX ADMIN — Medication SCH MG: at 07:56

## 2020-02-21 RX ADMIN — METOCLOPRAMIDE SCH MG: 5 INJECTION, SOLUTION INTRAMUSCULAR; INTRAVENOUS at 19:55

## 2020-02-21 RX ADMIN — ONDANSETRON SCH MG: 4 TABLET, ORALLY DISINTEGRATING ORAL at 19:53

## 2020-02-21 RX ADMIN — LEVOTHYROXINE SODIUM SCH MCG: 112 TABLET ORAL at 07:55

## 2020-02-21 NOTE — NUR
Patient in room PCU 3026. I have received report from JASSON Leblanc and had the opportunity to 
ask questions and assume patient care.

## 2020-02-21 NOTE — NUR
Malnutrition consult: Patient's current stated wt is stable with scaled wt hx. Pt continues 
with 0% PO intake on renal CHO controlled diet secondary to N/V, now day 3 with poor PO 
intake not meeting nutrient needs. Pt with no significant decrease in muscle strength or 
edema. Pt with no visible fat or muscle wasting. Pt currently lacks a minimum of two 
criteria for malnutrition. 

Will continue to follow closely.

-------------------------------------------------------------------------------

Addendum: 02/21/20 at 1130 by Demetria Nielsen RD

-------------------------------------------------------------------------------

Amended: Links added.

## 2020-02-21 NOTE — NUR
pt was feeling well enough to eat breakfast, and had a few bites, then pt took his 
medications PO.  the pt threw everything up approximately 10 minutes after.

## 2020-02-21 NOTE — NUR
Student Medication Administration:

For this medication-pass time frame, all medication were reviewed, dispensed, administered 
and documented per hospital policy by Chrissy DIEGO. 



Student documentation:

I have reviewed and agree with all interventions, assessments performed and documented by 
Chrissy DIEGO.

## 2020-02-21 NOTE — NUR
pt converting in and out of a fib 4 times within approximately 3 minutes.  pt received amio 
PO this AM as well as his continuous drip, which was ordered and applied last night.  the pt 
is still vomiting profusely, without much - if any - intake.

## 2020-02-21 NOTE — NUR
Rounded with Dr Siegel.  Recommended CT to check for blockage r/t the pt's n/v, however, 
Dr Siegel does not think it is necessary.  He also states to leave the pt on amio drip.

## 2020-02-21 NOTE — NUR
Problems reprioritized. Patient report given, questions answered & plan of care reviewed 
with Amna RN.

## 2020-02-22 VITALS — DIASTOLIC BLOOD PRESSURE: 60 MMHG | SYSTOLIC BLOOD PRESSURE: 83 MMHG

## 2020-02-22 VITALS — DIASTOLIC BLOOD PRESSURE: 46 MMHG | SYSTOLIC BLOOD PRESSURE: 113 MMHG

## 2020-02-22 VITALS — DIASTOLIC BLOOD PRESSURE: 82 MMHG | SYSTOLIC BLOOD PRESSURE: 145 MMHG

## 2020-02-22 VITALS — DIASTOLIC BLOOD PRESSURE: 23 MMHG | SYSTOLIC BLOOD PRESSURE: 56 MMHG

## 2020-02-22 VITALS — SYSTOLIC BLOOD PRESSURE: 97 MMHG | DIASTOLIC BLOOD PRESSURE: 40 MMHG

## 2020-02-22 VITALS — SYSTOLIC BLOOD PRESSURE: 113 MMHG | DIASTOLIC BLOOD PRESSURE: 62 MMHG

## 2020-02-22 VITALS — DIASTOLIC BLOOD PRESSURE: 42 MMHG | SYSTOLIC BLOOD PRESSURE: 141 MMHG

## 2020-02-22 VITALS — DIASTOLIC BLOOD PRESSURE: 35 MMHG | SYSTOLIC BLOOD PRESSURE: 87 MMHG

## 2020-02-22 VITALS — SYSTOLIC BLOOD PRESSURE: 97 MMHG | DIASTOLIC BLOOD PRESSURE: 57 MMHG

## 2020-02-22 LAB
ALBUMIN SERPL BCP-MCNC: 3 G/DL (ref 3.4–5)
ALBUMIN/GLOB SERPL: 0.8 {RATIO} (ref 1.1–1.5)
ALP SERPL-CCNC: 76 IU/L (ref 46–116)
ALT SERPL W P-5'-P-CCNC: 27 U/L (ref 12–78)
ANION GAP SERPL CALCULATED.3IONS-SCNC: 14 MMOL/L (ref 8–16)
ANISOCYTOSIS BLD QL SMEAR: (no result)
AST SERPL W P-5'-P-CCNC: 23 U/L (ref 10–37)
BASOPHILS # BLD AUTO: 0 X10'3 (ref 0–0.2)
BASOPHILS NFR BLD AUTO: 0.2 % (ref 0–1)
BILIRUB SERPL-MCNC: 0.5 MG/DL (ref 0.1–1)
BUN SERPL-MCNC: 55 MG/DL (ref 7–18)
BUN/CREAT SERPL: 7 (ref 5.4–32)
CALCIUM SERPL-MCNC: 9.6 MG/DL (ref 8.5–10.1)
CHLORIDE SERPL-SCNC: 92 MMOL/L (ref 99–107)
CO2 SERPL-SCNC: 27.6 MMOL/L (ref 24–32)
CREAT SERPL-MCNC: 7.89 MG/DL (ref 0.6–1.1)
EOSINOPHIL # BLD AUTO: 0 X10'3 (ref 0–0.9)
EOSINOPHIL NFR BLD AUTO: 0.2 % (ref 0–6)
EOSINOPHIL NFR BLD MANUAL: 1 % (ref 0–6)
ERYTHROCYTE [DISTWIDTH] IN BLOOD BY AUTOMATED COUNT: 14.2 % (ref 11.5–14.5)
GFR SERPL CREATININE-BSD FRML MDRD: 7 ML/MIN
GLUCOSE SERPL-MCNC: 152 MG/DL (ref 70–104)
HCT VFR BLD AUTO: 37.8 % (ref 42–52)
HGB BLD-MCNC: 12.5 G/DL (ref 14–17.9)
LYMPHOCYTES # BLD AUTO: 0.6 X10'3 (ref 1.1–4.8)
LYMPHOCYTES NFR BLD AUTO: 5.5 % (ref 21–51)
LYMPHOCYTES NFR BLD MANUAL: 4 % (ref 21–51)
MAGNESIUM SERPL-MCNC: 1.8 MG/DL (ref 1.5–2.4)
MCH RBC QN AUTO: 31.1 PG (ref 27–31)
MCHC RBC AUTO-ENTMCNC: 33.2 G/DL (ref 33–36.5)
MCV RBC AUTO: 93.9 FL (ref 78–98)
MONOCYTES # BLD AUTO: 1 X10'3 (ref 0–0.9)
MONOCYTES NFR BLD AUTO: 8.3 % (ref 2–12)
MONOCYTES NFR BLD MANUAL: 10 % (ref 2–12)
NEUTROPHILS # BLD AUTO: 9.9 X10'3 (ref 1.8–7.7)
NEUTROPHILS NFR BLD AUTO: 85.8 % (ref 42–75)
NEUTS BAND # BLD MANUAL: 13 % (ref 0–10)
NEUTS SEG NFR BLD MANUAL: 72 % (ref 42–75)
PHOSPHATE SERPL-MCNC: 4.3 MG/DL (ref 2.3–4.5)
PLATELET # BLD AUTO: 206 X10'3 (ref 140–440)
PLATELET BLD QL SMEAR: NORMAL
PMV BLD AUTO: 7.1 FL (ref 7.4–10.4)
POTASSIUM SERPL-SCNC: 3.8 MMOL/L (ref 3.5–5.1)
PROT SERPL-MCNC: 6.6 G/DL (ref 6.4–8.2)
RBC # BLD AUTO: 4.02 X10'6 (ref 4.7–6.1)
RBC MORPH BLD: (no result)
SODIUM SERPL-SCNC: 134 MMOL/L (ref 135–145)
TOTAL CELLS COUNTED FLD: 100
WBC # BLD AUTO: 11.5 X10'3 (ref 4.5–11)

## 2020-02-22 PROCEDURE — 5A1D70Z PERFORMANCE OF URINARY FILTRATION, INTERMITTENT, LESS THAN 6 HOURS PER DAY: ICD-10-PCS | Performed by: INTERNAL MEDICINE

## 2020-02-22 RX ADMIN — CALCIUM ACETATE SCH CAP: 667 CAPSULE ORAL at 17:19

## 2020-02-22 RX ADMIN — ONDANSETRON SCH MG: 4 TABLET, ORALLY DISINTEGRATING ORAL at 02:00

## 2020-02-22 RX ADMIN — INSULIN LISPRO SCH UNITS: 100 INJECTION, SOLUTION INTRAVENOUS; SUBCUTANEOUS at 19:18

## 2020-02-22 RX ADMIN — CALCIUM ACETATE SCH CAP: 667 CAPSULE ORAL at 12:00

## 2020-02-22 RX ADMIN — METOCLOPRAMIDE SCH MG: 5 INJECTION, SOLUTION INTRAMUSCULAR; INTRAVENOUS at 14:59

## 2020-02-22 RX ADMIN — PANTOPRAZOLE SODIUM SCH MG: 40 TABLET, DELAYED RELEASE ORAL at 08:00

## 2020-02-22 RX ADMIN — METOCLOPRAMIDE SCH MG: 5 INJECTION, SOLUTION INTRAMUSCULAR; INTRAVENOUS at 21:20

## 2020-02-22 RX ADMIN — AMIODARONE HYDROCHLORIDE SCH MLS/HR: 1.8 INJECTION, SOLUTION INTRAVENOUS at 09:11

## 2020-02-22 RX ADMIN — ONDANSETRON SCH MG: 4 TABLET, ORALLY DISINTEGRATING ORAL at 15:00

## 2020-02-22 RX ADMIN — AMIODARONE HYDROCHLORIDE SCH MLS/HR: 1.8 INJECTION, SOLUTION INTRAVENOUS at 15:15

## 2020-02-22 RX ADMIN — AMIODARONE HYDROCHLORIDE SCH MLS/HR: 1.8 INJECTION, SOLUTION INTRAVENOUS at 02:13

## 2020-02-22 RX ADMIN — HEPARIN SODIUM SCH UNIT: 5000 INJECTION, SOLUTION INTRAVENOUS; SUBCUTANEOUS at 08:02

## 2020-02-22 RX ADMIN — DOCUSATE SODIUM SCH MG: 100 CAPSULE, LIQUID FILLED ORAL at 21:19

## 2020-02-22 RX ADMIN — CALCIUM ACETATE SCH CAP: 667 CAPSULE ORAL at 07:00

## 2020-02-22 RX ADMIN — ONDANSETRON SCH MG: 4 TABLET, ORALLY DISINTEGRATING ORAL at 03:03

## 2020-02-22 RX ADMIN — METOCLOPRAMIDE SCH MG: 5 INJECTION, SOLUTION INTRAMUSCULAR; INTRAVENOUS at 03:02

## 2020-02-22 RX ADMIN — Medication SCH MG: at 08:00

## 2020-02-22 RX ADMIN — METOCLOPRAMIDE SCH MG: 5 INJECTION, SOLUTION INTRAMUSCULAR; INTRAVENOUS at 02:00

## 2020-02-22 RX ADMIN — LEVOTHYROXINE SODIUM SCH MCG: 25 TABLET ORAL at 07:59

## 2020-02-22 RX ADMIN — HEPARIN SODIUM SCH UNIT: 5000 INJECTION, SOLUTION INTRAVENOUS; SUBCUTANEOUS at 21:21

## 2020-02-22 RX ADMIN — METOCLOPRAMIDE SCH MG: 5 INJECTION, SOLUTION INTRAMUSCULAR; INTRAVENOUS at 08:03

## 2020-02-22 RX ADMIN — ONDANSETRON SCH MG: 4 TABLET, ORALLY DISINTEGRATING ORAL at 08:00

## 2020-02-22 RX ADMIN — Medication SCH EACH: at 08:00

## 2020-02-22 RX ADMIN — ONDANSETRON SCH MG: 4 TABLET, ORALLY DISINTEGRATING ORAL at 21:20

## 2020-02-22 RX ADMIN — DOCUSATE SODIUM SCH MG: 100 CAPSULE, LIQUID FILLED ORAL at 08:00

## 2020-02-22 RX ADMIN — INSULIN GLARGINE SCH UNIT: 100 INJECTION, SOLUTION SUBCUTANEOUS at 21:00

## 2020-02-22 RX ADMIN — LEVOTHYROXINE SODIUM SCH MCG: 112 TABLET ORAL at 07:59

## 2020-02-22 NOTE — NUR
F/U BP 84/41 (49).  Second 250cc bolus given.  IV infiltrated.  PICC nurse placed a 20g with 
a longer catheter (not an extended PIV).  Brachial vein clotted off per PICC nurse.  Pt is 
arousable, but obtunded.

## 2020-02-22 NOTE — NUR
Patient in room PCU 3026. I have received report from JASSON Woo and had the opportunity to 
ask questions and assume patient care.

## 2020-02-22 NOTE — NUR
Problems reprioritized. Patient report given, questions answered & plan of care reviewed 
with JASSON Morales.

## 2020-02-22 NOTE — NUR
Problems reprioritized. Patient report given, questions answered & plan of care reviewed 
with JASSON Moreno.

## 2020-02-22 NOTE — NUR
Spoke with MARCELLA Cerna regarding the patient's HR sustaining going in and out of afib/flutter 
with his HR being as high as 170s. She gave order that if SBP was 90s then to resume the 
Amio drip

## 2020-02-22 NOTE — NUR
pt's BP decreased.  manual BP of 68/40 and popliteal BP of 69/54 on monitor.  The pt is 
lethargic, but arousable to voice.  Did not stir when getting poked by lab or for IV, nor 
when his limbs are being manipulated.  NITIN Presley aware.

## 2020-02-22 NOTE — NUR
IV infiltrated.  new IV placed by Awais PICC RN, with a longer catheter via US.  Brachial 
vein clotted off per PICC RN.  BP cuff moved to R thigh for popliteal BP, per PICC RN to 
help prevent damage to new IV.

## 2020-02-22 NOTE — NUR
pt's BP 64/35 (41).  January, PA aware.  Orders for 250cc bolus with a rebolus of 250cc if 
no improvement.

-------------------------------------------------------------------------------

Addendum: 02/22/20 at 0645 by Amna Lopez RN

-------------------------------------------------------------------------------

HR of 161.

## 2020-02-22 NOTE — NUR
Contacted on call intensivist NP January d/t patient's sudden drop in BP to 56/23 that was 
confirmed with manual BP. Received order to give 250mL bolus and then recheck BP. NP came to 
bedside and gave order to pause Amiodarone for now but to restart it if patient sustains 
Afib/flutter with RVR for long period. Remeasured BP with NP at bedside and BP is now 102/68

## 2020-02-22 NOTE — NUR
pt BP 97/44 (55), HR 89 in a fib.  Per NITIN Presley, continue amio drip at 0.5mg and 
d/c all ativan orders.

## 2020-02-23 VITALS — DIASTOLIC BLOOD PRESSURE: 63 MMHG | SYSTOLIC BLOOD PRESSURE: 108 MMHG

## 2020-02-23 VITALS — SYSTOLIC BLOOD PRESSURE: 108 MMHG | DIASTOLIC BLOOD PRESSURE: 55 MMHG

## 2020-02-23 VITALS — SYSTOLIC BLOOD PRESSURE: 90 MMHG | DIASTOLIC BLOOD PRESSURE: 57 MMHG

## 2020-02-23 VITALS — DIASTOLIC BLOOD PRESSURE: 54 MMHG | SYSTOLIC BLOOD PRESSURE: 86 MMHG

## 2020-02-23 VITALS — SYSTOLIC BLOOD PRESSURE: 109 MMHG | DIASTOLIC BLOOD PRESSURE: 74 MMHG

## 2020-02-23 VITALS — SYSTOLIC BLOOD PRESSURE: 98 MMHG | DIASTOLIC BLOOD PRESSURE: 52 MMHG

## 2020-02-23 LAB
ALBUMIN SERPL BCP-MCNC: 3 G/DL (ref 3.4–5)
ALBUMIN/GLOB SERPL: 0.8 {RATIO} (ref 1.1–1.5)
ALP SERPL-CCNC: 73 IU/L (ref 46–116)
ALT SERPL W P-5'-P-CCNC: 29 U/L (ref 12–78)
ANION GAP SERPL CALCULATED.3IONS-SCNC: 10 MMOL/L (ref 8–16)
AST SERPL W P-5'-P-CCNC: 32 U/L (ref 10–37)
BASOPHILS # BLD AUTO: 0 X10'3 (ref 0–0.2)
BASOPHILS NFR BLD AUTO: 0.3 % (ref 0–1)
BILIRUB SERPL-MCNC: 0.6 MG/DL (ref 0.1–1)
BUN SERPL-MCNC: 28 MG/DL (ref 7–18)
BUN/CREAT SERPL: 4.8 (ref 5.4–32)
CALCIUM SERPL-MCNC: 9.7 MG/DL (ref 8.5–10.1)
CHLORIDE SERPL-SCNC: 95 MMOL/L (ref 99–107)
CO2 SERPL-SCNC: 27.9 MMOL/L (ref 24–32)
CREAT SERPL-MCNC: 5.79 MG/DL (ref 0.6–1.1)
EOSINOPHIL # BLD AUTO: 0.1 X10'3 (ref 0–0.9)
EOSINOPHIL NFR BLD AUTO: 0.9 % (ref 0–6)
ERYTHROCYTE [DISTWIDTH] IN BLOOD BY AUTOMATED COUNT: 14.5 % (ref 11.5–14.5)
GFR SERPL CREATININE-BSD FRML MDRD: 10 ML/MIN
GLUCOSE SERPL-MCNC: 149 MG/DL (ref 70–104)
HCT VFR BLD AUTO: 34.8 % (ref 42–52)
HGB BLD-MCNC: 11.6 G/DL (ref 14–17.9)
LYMPHOCYTES # BLD AUTO: 0.8 X10'3 (ref 1.1–4.8)
LYMPHOCYTES NFR BLD AUTO: 10.2 % (ref 21–51)
MCH RBC QN AUTO: 31.7 PG (ref 27–31)
MCHC RBC AUTO-ENTMCNC: 33.4 G/DL (ref 33–36.5)
MCV RBC AUTO: 94.8 FL (ref 78–98)
MONOCYTES # BLD AUTO: 0.6 X10'3 (ref 0–0.9)
MONOCYTES NFR BLD AUTO: 7.8 % (ref 2–12)
NEUTROPHILS # BLD AUTO: 6 X10'3 (ref 1.8–7.7)
NEUTROPHILS NFR BLD AUTO: 80.8 % (ref 42–75)
PLATELET # BLD AUTO: 193 X10'3 (ref 140–440)
PMV BLD AUTO: 7.5 FL (ref 7.4–10.4)
POTASSIUM SERPL-SCNC: 4 MMOL/L (ref 3.5–5.1)
PROT SERPL-MCNC: 6.9 G/DL (ref 6.4–8.2)
RBC # BLD AUTO: 3.67 X10'6 (ref 4.7–6.1)
SODIUM SERPL-SCNC: 133 MMOL/L (ref 135–145)
WBC # BLD AUTO: 7.4 X10'3 (ref 4.5–11)

## 2020-02-23 RX ADMIN — HEPARIN SODIUM SCH UNIT: 5000 INJECTION, SOLUTION INTRAVENOUS; SUBCUTANEOUS at 21:54

## 2020-02-23 RX ADMIN — ONDANSETRON SCH MG: 4 TABLET, ORALLY DISINTEGRATING ORAL at 02:00

## 2020-02-23 RX ADMIN — INSULIN GLARGINE SCH UNIT: 100 INJECTION, SOLUTION SUBCUTANEOUS at 21:00

## 2020-02-23 RX ADMIN — METOCLOPRAMIDE SCH MG: 5 INJECTION, SOLUTION INTRAMUSCULAR; INTRAVENOUS at 02:00

## 2020-02-23 RX ADMIN — CALCIUM ACETATE SCH CAP: 667 CAPSULE ORAL at 12:31

## 2020-02-23 RX ADMIN — METOCLOPRAMIDE SCH MG: 5 INJECTION, SOLUTION INTRAMUSCULAR; INTRAVENOUS at 07:47

## 2020-02-23 RX ADMIN — HEPARIN SODIUM SCH UNIT: 5000 INJECTION, SOLUTION INTRAVENOUS; SUBCUTANEOUS at 07:47

## 2020-02-23 RX ADMIN — DOCUSATE SODIUM SCH MG: 100 CAPSULE, LIQUID FILLED ORAL at 07:48

## 2020-02-23 RX ADMIN — CALCIUM ACETATE SCH CAP: 667 CAPSULE ORAL at 17:11

## 2020-02-23 RX ADMIN — PANTOPRAZOLE SODIUM SCH MG: 40 TABLET, DELAYED RELEASE ORAL at 07:48

## 2020-02-23 RX ADMIN — METOCLOPRAMIDE SCH MG: 5 INJECTION, SOLUTION INTRAMUSCULAR; INTRAVENOUS at 20:00

## 2020-02-23 RX ADMIN — METOCLOPRAMIDE SCH MG: 5 INJECTION, SOLUTION INTRAMUSCULAR; INTRAVENOUS at 14:47

## 2020-02-23 RX ADMIN — CALCIUM ACETATE SCH CAP: 667 CAPSULE ORAL at 07:41

## 2020-02-23 RX ADMIN — LEVOTHYROXINE SODIUM SCH MCG: 112 TABLET ORAL at 07:51

## 2020-02-23 RX ADMIN — DOCUSATE SODIUM SCH MG: 100 CAPSULE, LIQUID FILLED ORAL at 21:57

## 2020-02-23 RX ADMIN — ONDANSETRON SCH MG: 4 TABLET, ORALLY DISINTEGRATING ORAL at 14:00

## 2020-02-23 RX ADMIN — Medication SCH MG: at 07:45

## 2020-02-23 RX ADMIN — ONDANSETRON SCH MG: 4 TABLET, ORALLY DISINTEGRATING ORAL at 07:57

## 2020-02-23 RX ADMIN — INSULIN LISPRO SCH UNITS: 100 INJECTION, SOLUTION INTRAVENOUS; SUBCUTANEOUS at 09:09

## 2020-02-23 RX ADMIN — LEVOTHYROXINE SODIUM SCH MCG: 25 TABLET ORAL at 07:48

## 2020-02-23 RX ADMIN — Medication SCH EACH: at 07:46

## 2020-02-23 RX ADMIN — HEPARIN SODIUM SCH UNIT: 5000 INJECTION, SOLUTION INTRAVENOUS; SUBCUTANEOUS at 21:56

## 2020-02-23 RX ADMIN — ONDANSETRON SCH MG: 4 TABLET, ORALLY DISINTEGRATING ORAL at 20:00

## 2020-02-23 NOTE — NUR
Non-admin'd heparin and xylocaine from 2/22 that was for dialysis; dialysis was done on 2/22 
and needed to be cleared from [facility]

## 2020-02-23 NOTE — NUR
Spoke to Paige Leary, she is aware that patient is in sinus rhythm to sinus tach, with 
occasional HR in 130's-140's

## 2020-02-23 NOTE — NUR
Problems reprioritized. Patient report given, questions answered & plan of care reviewed 
with Carmen SPAULDING.

## 2020-02-23 NOTE — NUR
Problems reprioritized. Patient report given, questions answered & plan of care reviewed 
with Janis SPAULDING. 

-------------------------------------------------------------------------------

Addendum: 02/23/20 at 1824 by Mackenzie Perez RN

-------------------------------------------------------------------------------

**INCORRECT NURSE. IGNORE NOTE**

## 2020-02-23 NOTE — NUR
Notified Dr. Deena Haq of decreased hemoglobin. Instructed to hold Eliquis until Dr. Shane Quiles sees patient. Patient in room PCU 3027W. I have received report from Carmen SPAULDING   and had the opportunity 
to ask questions and assume patient care.

## 2020-02-24 VITALS — DIASTOLIC BLOOD PRESSURE: 101 MMHG | SYSTOLIC BLOOD PRESSURE: 119 MMHG

## 2020-02-24 VITALS — DIASTOLIC BLOOD PRESSURE: 66 MMHG | SYSTOLIC BLOOD PRESSURE: 113 MMHG

## 2020-02-24 VITALS — SYSTOLIC BLOOD PRESSURE: 127 MMHG | DIASTOLIC BLOOD PRESSURE: 56 MMHG

## 2020-02-24 VITALS — SYSTOLIC BLOOD PRESSURE: 90 MMHG | DIASTOLIC BLOOD PRESSURE: 74 MMHG

## 2020-02-24 VITALS — DIASTOLIC BLOOD PRESSURE: 70 MMHG | SYSTOLIC BLOOD PRESSURE: 89 MMHG

## 2020-02-24 VITALS — SYSTOLIC BLOOD PRESSURE: 107 MMHG | DIASTOLIC BLOOD PRESSURE: 90 MMHG

## 2020-02-24 VITALS — SYSTOLIC BLOOD PRESSURE: 93 MMHG | DIASTOLIC BLOOD PRESSURE: 64 MMHG

## 2020-02-24 VITALS — DIASTOLIC BLOOD PRESSURE: 60 MMHG | SYSTOLIC BLOOD PRESSURE: 140 MMHG

## 2020-02-24 LAB
ALBUMIN SERPL BCP-MCNC: 2.7 G/DL (ref 3.4–5)
ALBUMIN/GLOB SERPL: 0.8 {RATIO} (ref 1.1–1.5)
ALP SERPL-CCNC: 70 IU/L (ref 46–116)
ALT SERPL W P-5'-P-CCNC: 30 U/L (ref 12–78)
ANION GAP SERPL CALCULATED.3IONS-SCNC: 8 MMOL/L (ref 8–16)
AST SERPL W P-5'-P-CCNC: 35 U/L (ref 10–37)
BASOPHILS # BLD AUTO: 0 X10'3 (ref 0–0.2)
BASOPHILS NFR BLD AUTO: 0.4 % (ref 0–1)
BILIRUB SERPL-MCNC: 0.5 MG/DL (ref 0.1–1)
BUN SERPL-MCNC: 45 MG/DL (ref 7–18)
BUN/CREAT SERPL: 6 (ref 5.4–32)
CALCIUM SERPL-MCNC: 9.1 MG/DL (ref 8.5–10.1)
CHLORIDE SERPL-SCNC: 95 MMOL/L (ref 99–107)
CO2 SERPL-SCNC: 26.7 MMOL/L (ref 24–32)
CREAT SERPL-MCNC: 7.49 MG/DL (ref 0.6–1.1)
EOSINOPHIL # BLD AUTO: 0.1 X10'3 (ref 0–0.9)
EOSINOPHIL NFR BLD AUTO: 0.9 % (ref 0–6)
ERYTHROCYTE [DISTWIDTH] IN BLOOD BY AUTOMATED COUNT: 14.2 % (ref 11.5–14.5)
GFR SERPL CREATININE-BSD FRML MDRD: 8 ML/MIN
GLUCOSE SERPL-MCNC: 129 MG/DL (ref 70–104)
HCT VFR BLD AUTO: 32.1 % (ref 42–52)
HGB BLD-MCNC: 10.8 G/DL (ref 14–17.9)
LYMPHOCYTES # BLD AUTO: 0.9 X10'3 (ref 1.1–4.8)
LYMPHOCYTES NFR BLD AUTO: 11.4 % (ref 21–51)
MCH RBC QN AUTO: 31.8 PG (ref 27–31)
MCHC RBC AUTO-ENTMCNC: 33.7 G/DL (ref 33–36.5)
MCV RBC AUTO: 94.2 FL (ref 78–98)
MONOCYTES # BLD AUTO: 0.8 X10'3 (ref 0–0.9)
MONOCYTES NFR BLD AUTO: 10.3 % (ref 2–12)
NEUTROPHILS # BLD AUTO: 6.3 X10'3 (ref 1.8–7.7)
NEUTROPHILS NFR BLD AUTO: 77 % (ref 42–75)
PLATELET # BLD AUTO: 190 X10'3 (ref 140–440)
PMV BLD AUTO: 7.6 FL (ref 7.4–10.4)
POTASSIUM SERPL-SCNC: 3.9 MMOL/L (ref 3.5–5.1)
PROT SERPL-MCNC: 6.1 G/DL (ref 6.4–8.2)
RBC # BLD AUTO: 3.41 X10'6 (ref 4.7–6.1)
SODIUM SERPL-SCNC: 130 MMOL/L (ref 135–145)
WBC # BLD AUTO: 8.2 X10'3 (ref 4.5–11)

## 2020-02-24 RX ADMIN — ONDANSETRON SCH MG: 4 TABLET, ORALLY DISINTEGRATING ORAL at 02:00

## 2020-02-24 RX ADMIN — INSULIN LISPRO SCH UNITS: 100 INJECTION, SOLUTION INTRAVENOUS; SUBCUTANEOUS at 18:45

## 2020-02-24 RX ADMIN — LEVOTHYROXINE SODIUM SCH MCG: 25 TABLET ORAL at 08:11

## 2020-02-24 RX ADMIN — METOCLOPRAMIDE SCH MG: 5 INJECTION, SOLUTION INTRAMUSCULAR; INTRAVENOUS at 08:13

## 2020-02-24 RX ADMIN — METOCLOPRAMIDE SCH MG: 5 INJECTION, SOLUTION INTRAMUSCULAR; INTRAVENOUS at 02:00

## 2020-02-24 RX ADMIN — Medication SCH MG: at 08:10

## 2020-02-24 RX ADMIN — ONDANSETRON SCH MG: 4 TABLET, ORALLY DISINTEGRATING ORAL at 20:00

## 2020-02-24 RX ADMIN — Medication SCH EACH: at 08:12

## 2020-02-24 RX ADMIN — ONDANSETRON SCH MG: 4 TABLET, ORALLY DISINTEGRATING ORAL at 14:00

## 2020-02-24 RX ADMIN — CALCIUM ACETATE SCH CAP: 667 CAPSULE ORAL at 12:11

## 2020-02-24 RX ADMIN — DOCUSATE SODIUM SCH MG: 100 CAPSULE, LIQUID FILLED ORAL at 08:11

## 2020-02-24 RX ADMIN — INSULIN GLARGINE SCH UNIT: 100 INJECTION, SOLUTION SUBCUTANEOUS at 21:06

## 2020-02-24 RX ADMIN — LEVOTHYROXINE SODIUM SCH MCG: 112 TABLET ORAL at 08:11

## 2020-02-24 RX ADMIN — HEPARIN SODIUM SCH UNIT: 5000 INJECTION, SOLUTION INTRAVENOUS; SUBCUTANEOUS at 20:08

## 2020-02-24 RX ADMIN — HEPARIN SODIUM SCH UNIT: 5000 INJECTION, SOLUTION INTRAVENOUS; SUBCUTANEOUS at 08:13

## 2020-02-24 RX ADMIN — CALCIUM ACETATE SCH CAP: 667 CAPSULE ORAL at 17:09

## 2020-02-24 RX ADMIN — DOCUSATE SODIUM SCH MG: 100 CAPSULE, LIQUID FILLED ORAL at 20:08

## 2020-02-24 RX ADMIN — CALCIUM ACETATE SCH CAP: 667 CAPSULE ORAL at 08:11

## 2020-02-24 RX ADMIN — ONDANSETRON SCH MG: 4 TABLET, ORALLY DISINTEGRATING ORAL at 08:00

## 2020-02-24 RX ADMIN — PANTOPRAZOLE SODIUM SCH MG: 40 TABLET, DELAYED RELEASE ORAL at 08:12

## 2020-02-24 RX ADMIN — INSULIN LISPRO SCH UNITS: 100 INJECTION, SOLUTION INTRAVENOUS; SUBCUTANEOUS at 13:50

## 2020-02-24 NOTE — NUR
Problems reprioritized. Patient report given, questions answered & plan of care reviewed 
with Karla SPAULDING.

## 2020-02-24 NOTE — NUR
Manual BP 78/62. Patient asymptomatic. Called Dr Shipman for any new orders, MD did not pick 
up

-------------------------------------------------------------------------------

Addendum: 02/24/20 at 1534 by Mackenzie Perez RN

-------------------------------------------------------------------------------

placed patient in Trendelenburg position, BP increased to 96/60. Will continue to monitor 
closely and speak to MD when he returns call

## 2020-02-24 NOTE — NUR
Problems reprioritized. Patient report given, questions answered & plan of care reviewed 
with Carmen SPAULDING. 

-------------------------------------------------------------------------------

Addendum: 02/24/20 at 1830 by Mackenzie Perez RN

-------------------------------------------------------------------------------

*** INCORRECT NURSE

## 2020-02-24 NOTE — NUR
Patient in room PCU 3028K. I have received report from Carmen SPAULDING  and had the opportunity to 
ask questions and assume patient care.

## 2020-02-24 NOTE — NUR
Spoke to Dr Shipman when he rounded on floor, and he placed orders for patient to be up 
ambulating 3-4 times/day and he is aware that patient continues to go in and out of 
afib/flutter- no new orders.

## 2020-02-24 NOTE — NUR
Patient in room PCU 3026. I have received report from JASSON Mann and had the opportunity to 
ask questions and assume patient care. Patient just finished dinner and is laying 
comfortably on hospital bed. He is A&Ox3 and SHERMAN

## 2020-02-25 VITALS — SYSTOLIC BLOOD PRESSURE: 119 MMHG | DIASTOLIC BLOOD PRESSURE: 54 MMHG

## 2020-02-25 VITALS — DIASTOLIC BLOOD PRESSURE: 50 MMHG | SYSTOLIC BLOOD PRESSURE: 72 MMHG

## 2020-02-25 VITALS — SYSTOLIC BLOOD PRESSURE: 112 MMHG | DIASTOLIC BLOOD PRESSURE: 63 MMHG

## 2020-02-25 VITALS — SYSTOLIC BLOOD PRESSURE: 109 MMHG | DIASTOLIC BLOOD PRESSURE: 58 MMHG

## 2020-02-25 VITALS — DIASTOLIC BLOOD PRESSURE: 56 MMHG | SYSTOLIC BLOOD PRESSURE: 107 MMHG

## 2020-02-25 PROCEDURE — 5A1D70Z PERFORMANCE OF URINARY FILTRATION, INTERMITTENT, LESS THAN 6 HOURS PER DAY: ICD-10-PCS | Performed by: INTERNAL MEDICINE

## 2020-02-25 RX ADMIN — HEPARIN SODIUM SCH UNIT: 5000 INJECTION, SOLUTION INTRAVENOUS; SUBCUTANEOUS at 07:34

## 2020-02-25 RX ADMIN — Medication SCH MG: at 07:35

## 2020-02-25 RX ADMIN — ONDANSETRON SCH MG: 4 TABLET, ORALLY DISINTEGRATING ORAL at 14:00

## 2020-02-25 RX ADMIN — CALCIUM ACETATE SCH CAP: 667 CAPSULE ORAL at 19:02

## 2020-02-25 RX ADMIN — LEVOTHYROXINE SODIUM SCH MCG: 25 TABLET ORAL at 07:34

## 2020-02-25 RX ADMIN — ONDANSETRON SCH MG: 4 TABLET, ORALLY DISINTEGRATING ORAL at 07:41

## 2020-02-25 RX ADMIN — ONDANSETRON SCH MG: 4 TABLET, ORALLY DISINTEGRATING ORAL at 20:00

## 2020-02-25 RX ADMIN — DOCUSATE SODIUM SCH MG: 100 CAPSULE, LIQUID FILLED ORAL at 20:29

## 2020-02-25 RX ADMIN — INSULIN GLARGINE SCH UNIT: 100 INJECTION, SOLUTION SUBCUTANEOUS at 21:31

## 2020-02-25 RX ADMIN — CALCIUM ACETATE SCH CAP: 667 CAPSULE ORAL at 07:35

## 2020-02-25 RX ADMIN — Medication SCH EACH: at 07:34

## 2020-02-25 RX ADMIN — CALCIUM ACETATE SCH CAP: 667 CAPSULE ORAL at 12:24

## 2020-02-25 RX ADMIN — LEVOTHYROXINE SODIUM SCH MCG: 112 TABLET ORAL at 07:35

## 2020-02-25 RX ADMIN — ONDANSETRON SCH MG: 4 TABLET, ORALLY DISINTEGRATING ORAL at 02:00

## 2020-02-25 RX ADMIN — ONDANSETRON SCH MG: 4 TABLET, ORALLY DISINTEGRATING ORAL at 07:35

## 2020-02-25 RX ADMIN — HEPARIN SODIUM SCH UNIT: 5000 INJECTION, SOLUTION INTRAVENOUS; SUBCUTANEOUS at 20:31

## 2020-02-25 RX ADMIN — PANTOPRAZOLE SODIUM SCH MG: 40 TABLET, DELAYED RELEASE ORAL at 07:34

## 2020-02-25 RX ADMIN — DOCUSATE SODIUM SCH MG: 100 CAPSULE, LIQUID FILLED ORAL at 07:35

## 2020-02-25 NOTE — NUR
Patient in room PCU 3026. I have received report from Cornelia SPAULDING and had the opportunity to 
ask questions and assume patient care.

## 2020-02-25 NOTE — NUR
Problems reprioritized. Patient report given, questions answered & plan of care reviewed 
with Tim mari.

## 2020-02-25 NOTE — NUR
Problems reprioritized. Patient report given, questions answered & plan of care reviewed 
with Cornelia SPAULDING.

## 2020-02-25 NOTE — NUR
Patient did not qualify to receive insulin after breakfast per protocol. He ate 5 carbs and 
his blood glucose is 116. Will continue to monitor.

## 2020-02-25 NOTE — NUR
Patient in room PCU 3026. I have received report from   Karla SPAULDING and had the opportunity to 
ask questions and assume patient care.

## 2020-02-25 NOTE — NUR
Problems reprioritized. Patient report given, questions answered & plan of care reviewed 
with JASSON Locke.

## 2020-02-25 NOTE — NUR
Patient did not qualify to receive insulin after breakfast per protocol. He ate 6 carbs and 
his blood glucose is 110. Will continue to monitor.

-------------------------------------------------------------------------------

Addendum: 02/25/20 at 1527 by Michelle Odom RN

-------------------------------------------------------------------------------

*after lunch

## 2020-02-26 VITALS — SYSTOLIC BLOOD PRESSURE: 90 MMHG | DIASTOLIC BLOOD PRESSURE: 56 MMHG

## 2020-02-26 VITALS — DIASTOLIC BLOOD PRESSURE: 51 MMHG | SYSTOLIC BLOOD PRESSURE: 100 MMHG

## 2020-02-26 RX ADMIN — DOCUSATE SODIUM SCH MG: 100 CAPSULE, LIQUID FILLED ORAL at 08:45

## 2020-02-26 RX ADMIN — LEVOTHYROXINE SODIUM SCH MCG: 112 TABLET ORAL at 08:45

## 2020-02-26 RX ADMIN — Medication SCH MG: at 08:45

## 2020-02-26 RX ADMIN — LEVOTHYROXINE SODIUM SCH MCG: 25 TABLET ORAL at 08:45

## 2020-02-26 RX ADMIN — Medication SCH EACH: at 08:47

## 2020-02-26 RX ADMIN — HEPARIN SODIUM SCH UNIT: 5000 INJECTION, SOLUTION INTRAVENOUS; SUBCUTANEOUS at 08:46

## 2020-02-26 RX ADMIN — PANTOPRAZOLE SODIUM SCH MG: 40 TABLET, DELAYED RELEASE ORAL at 08:45

## 2020-02-26 RX ADMIN — ONDANSETRON SCH MG: 4 TABLET, ORALLY DISINTEGRATING ORAL at 08:00

## 2020-02-26 RX ADMIN — CALCIUM ACETATE SCH CAP: 667 CAPSULE ORAL at 07:59

## 2020-02-26 RX ADMIN — ONDANSETRON SCH MG: 4 TABLET, ORALLY DISINTEGRATING ORAL at 02:00

## 2020-02-26 NOTE — NUR
Problems reprioritized. Patient report given, questions answered & plan of care reviewed 
with Michelle SPAULDING.

## 2020-02-26 NOTE — NUR
Patient stable for discharge per MD order. All discharge information and education reviewed 
with patient before signing necessary paperwork, including new prescription information. All 
patient belongings packed up and sent with patient, bedside monitor removed, IV removed with 
catheter in tact. Patient wheeled down to lobby, driven by friend in private vehicle. New 
prescription called in to [facility] on [address].

## 2020-08-24 ENCOUNTER — HOSPITAL ENCOUNTER (OUTPATIENT)
Dept: HOSPITAL 94 - SSTAY O | Age: 60
Discharge: HOME | End: 2020-08-24
Attending: RADIOLOGY
Payer: MEDICARE

## 2020-08-24 VITALS — DIASTOLIC BLOOD PRESSURE: 80 MMHG | SYSTOLIC BLOOD PRESSURE: 158 MMHG

## 2020-08-24 VITALS — DIASTOLIC BLOOD PRESSURE: 84 MMHG | SYSTOLIC BLOOD PRESSURE: 155 MMHG

## 2020-08-24 VITALS — DIASTOLIC BLOOD PRESSURE: 80 MMHG | SYSTOLIC BLOOD PRESSURE: 140 MMHG

## 2020-08-24 VITALS — SYSTOLIC BLOOD PRESSURE: 162 MMHG | DIASTOLIC BLOOD PRESSURE: 82 MMHG

## 2020-08-24 VITALS — WEIGHT: 165.13 LBS | BODY MASS INDEX: 26.54 KG/M2 | HEIGHT: 66 IN

## 2020-08-24 VITALS — SYSTOLIC BLOOD PRESSURE: 112 MMHG | DIASTOLIC BLOOD PRESSURE: 63 MMHG

## 2020-08-24 VITALS — SYSTOLIC BLOOD PRESSURE: 138 MMHG | DIASTOLIC BLOOD PRESSURE: 72 MMHG

## 2020-08-24 VITALS — SYSTOLIC BLOOD PRESSURE: 145 MMHG | DIASTOLIC BLOOD PRESSURE: 72 MMHG

## 2020-08-24 DIAGNOSIS — Z90.49: ICD-10-CM

## 2020-08-24 DIAGNOSIS — Z79.899: ICD-10-CM

## 2020-08-24 DIAGNOSIS — Z82.3: ICD-10-CM

## 2020-08-24 DIAGNOSIS — I12.0: ICD-10-CM

## 2020-08-24 DIAGNOSIS — I13.0: ICD-10-CM

## 2020-08-24 DIAGNOSIS — E03.9: ICD-10-CM

## 2020-08-24 DIAGNOSIS — E78.00: ICD-10-CM

## 2020-08-24 DIAGNOSIS — I48.0: ICD-10-CM

## 2020-08-24 DIAGNOSIS — Z82.49: ICD-10-CM

## 2020-08-24 DIAGNOSIS — E11.22: ICD-10-CM

## 2020-08-24 DIAGNOSIS — G89.29: ICD-10-CM

## 2020-08-24 DIAGNOSIS — Z98.890: ICD-10-CM

## 2020-08-24 DIAGNOSIS — T82.858A: Primary | ICD-10-CM

## 2020-08-24 DIAGNOSIS — E11.42: ICD-10-CM

## 2020-08-24 DIAGNOSIS — K21.9: ICD-10-CM

## 2020-08-24 DIAGNOSIS — N18.6: ICD-10-CM
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
## 2020-08-24 PROCEDURE — 76937 US GUIDE VASCULAR ACCESS: CPT

## 2020-08-24 PROCEDURE — 99153 MOD SED SAME PHYS/QHP EA: CPT

## 2020-08-24 PROCEDURE — 36902 INTRO CATH DIALYSIS CIRCUIT: CPT

## 2020-08-24 PROCEDURE — 99152 MOD SED SAME PHYS/QHP 5/>YRS: CPT

## 2020-09-18 ENCOUNTER — HOSPITAL ENCOUNTER (EMERGENCY)
Dept: HOSPITAL 94 - ER | Age: 60
Discharge: HOME | End: 2020-09-18
Payer: MEDICARE

## 2020-09-18 VITALS — HEIGHT: 65 IN | BODY MASS INDEX: 26.71 KG/M2 | WEIGHT: 160.34 LBS

## 2020-09-18 VITALS — SYSTOLIC BLOOD PRESSURE: 138 MMHG | DIASTOLIC BLOOD PRESSURE: 63 MMHG

## 2020-09-18 DIAGNOSIS — Z79.899: ICD-10-CM

## 2020-09-18 DIAGNOSIS — I13.2: ICD-10-CM

## 2020-09-18 DIAGNOSIS — Z79.01: ICD-10-CM

## 2020-09-18 DIAGNOSIS — E03.9: ICD-10-CM

## 2020-09-18 DIAGNOSIS — F32.9: ICD-10-CM

## 2020-09-18 DIAGNOSIS — E11.65: ICD-10-CM

## 2020-09-18 DIAGNOSIS — F15.90: ICD-10-CM

## 2020-09-18 DIAGNOSIS — R11.2: ICD-10-CM

## 2020-09-18 DIAGNOSIS — I50.9: ICD-10-CM

## 2020-09-18 DIAGNOSIS — E11.42: ICD-10-CM

## 2020-09-18 DIAGNOSIS — G89.29: ICD-10-CM

## 2020-09-18 DIAGNOSIS — N18.6: Primary | ICD-10-CM

## 2020-09-18 DIAGNOSIS — Z99.2: ICD-10-CM

## 2020-09-18 DIAGNOSIS — F12.90: ICD-10-CM

## 2020-09-18 DIAGNOSIS — K21.9: ICD-10-CM

## 2020-09-18 DIAGNOSIS — E11.22: ICD-10-CM

## 2020-09-18 DIAGNOSIS — Z90.49: ICD-10-CM

## 2020-09-18 DIAGNOSIS — E78.00: ICD-10-CM

## 2020-09-18 LAB
ALBUMIN SERPL BCP-MCNC: 4.1 G/DL (ref 3.4–5)
ALBUMIN/GLOB SERPL: 1.1 {RATIO} (ref 1.1–1.5)
ALP SERPL-CCNC: 82 IU/L (ref 46–116)
ALT SERPL W P-5'-P-CCNC: 35 U/L (ref 12–78)
ANION GAP SERPL CALCULATED.3IONS-SCNC: 13 MMOL/L (ref 8–16)
AST SERPL W P-5'-P-CCNC: 18 U/L (ref 10–37)
BACTERIA URNS QL MICRO: (no result) /HPF
BASOPHILS # BLD AUTO: 0.1 X10'3 (ref 0–0.2)
BASOPHILS NFR BLD AUTO: 0.4 % (ref 0–1)
BILIRUB SERPL-MCNC: 0.3 MG/DL (ref 0.1–1)
BUN SERPL-MCNC: 39 MG/DL (ref 7–18)
BUN/CREAT SERPL: 7.1 (ref 5.4–32)
CALCIUM SERPL-MCNC: 10.3 MG/DL (ref 8.5–10.1)
CHLORIDE SERPL-SCNC: 98 MMOL/L (ref 99–107)
CLARITY UR: CLEAR
CO2 SERPL-SCNC: 26.8 MMOL/L (ref 24–32)
COLOR UR: YELLOW
CREAT SERPL-MCNC: 5.49 MG/DL (ref 0.6–1.1)
DEPRECATED SQUAMOUS URNS QL MICRO: (no result) /LPF
EOSINOPHIL # BLD AUTO: 0.1 X10'3 (ref 0–0.9)
EOSINOPHIL NFR BLD AUTO: 0.4 % (ref 0–6)
ERYTHROCYTE [DISTWIDTH] IN BLOOD BY AUTOMATED COUNT: 13.9 % (ref 11.5–14.5)
GFR SERPL CREATININE-BSD FRML MDRD: 11 ML/MIN
GLUCOSE SERPL-MCNC: 396 MG/DL (ref 70–104)
GLUCOSE UR STRIP-MCNC: >=1000 MG/DL
HCT VFR BLD AUTO: 35.6 % (ref 42–52)
HGB BLD-MCNC: 12.1 G/DL (ref 14–17.9)
HGB UR QL STRIP: (no result)
KETONES UR STRIP-MCNC: NEGATIVE MG/DL
LEUKOCYTE ESTERASE UR QL STRIP: NEGATIVE
LYMPHOCYTES # BLD AUTO: 0.5 X10'3 (ref 1.1–4.8)
LYMPHOCYTES NFR BLD AUTO: 2.6 % (ref 21–51)
MAGNESIUM SERPL-MCNC: 2 MG/DL (ref 1.5–2.4)
MCH RBC QN AUTO: 32.3 PG (ref 27–31)
MCHC RBC AUTO-ENTMCNC: 34 G/DL (ref 33–36.5)
MCV RBC AUTO: 94.7 FL (ref 78–98)
MONOCYTES # BLD AUTO: 1 X10'3 (ref 0–0.9)
MONOCYTES NFR BLD AUTO: 5.6 % (ref 2–12)
NEUTROPHILS # BLD AUTO: 16.1 X10'3 (ref 1.8–7.7)
NEUTROPHILS NFR BLD AUTO: 91 % (ref 42–75)
NITRITE UR QL STRIP: NEGATIVE
PH UR STRIP: 7 [PH] (ref 4.8–8)
PHOSPHATE SERPL-MCNC: 4.7 MG/DL (ref 2.3–4.5)
PLATELET # BLD AUTO: 250 X10'3 (ref 140–440)
PMV BLD AUTO: 6.7 FL (ref 7.4–10.4)
POTASSIUM SERPL-SCNC: 3 MMOL/L (ref 3.5–5.1)
PROT SERPL-MCNC: 8 G/DL (ref 6.4–8.2)
PROT UR QL STRIP: 100 MG/DL
RBC # BLD AUTO: 3.76 X10'6 (ref 4.7–6.1)
RBC #/AREA URNS HPF: (no result) /HPF (ref 0–2)
SODIUM SERPL-SCNC: 138 MMOL/L (ref 135–145)
SP GR UR STRIP: 1.01 (ref 1–1.03)
URN COLLECT METHOD CLASS: (no result)
UROBILINOGEN UR STRIP-MCNC: 0.2 E.U/DL (ref 0.2–1)
WBC # BLD AUTO: 17.7 X10'3 (ref 4.5–11)
WBC #/AREA URNS HPF: (no result) /HPF (ref 0–4)

## 2020-09-18 PROCEDURE — 36415 COLL VENOUS BLD VENIPUNCTURE: CPT

## 2020-09-18 PROCEDURE — 83735 ASSAY OF MAGNESIUM: CPT

## 2020-09-18 PROCEDURE — 96374 THER/PROPH/DIAG INJ IV PUSH: CPT

## 2020-09-18 PROCEDURE — 80053 COMPREHEN METABOLIC PANEL: CPT

## 2020-09-18 PROCEDURE — 96375 TX/PRO/DX INJ NEW DRUG ADDON: CPT

## 2020-09-18 PROCEDURE — 85025 COMPLETE CBC W/AUTO DIFF WBC: CPT

## 2020-09-18 PROCEDURE — 82948 REAGENT STRIP/BLOOD GLUCOSE: CPT

## 2020-09-18 PROCEDURE — 84100 ASSAY OF PHOSPHORUS: CPT

## 2020-09-18 PROCEDURE — 81001 URINALYSIS AUTO W/SCOPE: CPT

## 2020-09-18 PROCEDURE — 99285 EMERGENCY DEPT VISIT HI MDM: CPT

## 2021-01-13 LAB
ALBUMIN SERPL BCP-MCNC: 3.8 G/DL (ref 3.4–5)
ALBUMIN/GLOB SERPL: 1 {RATIO} (ref 1.1–1.5)
ALP SERPL-CCNC: 98 IU/L (ref 46–116)
ALT SERPL W P-5'-P-CCNC: 26 U/L (ref 30–65)
ANION GAP SERPL CALCULATED.3IONS-SCNC: 9 MMOL/L (ref 8–16)
APTT PPP: 30 SECONDS (ref 22–32)
AST SERPL W P-5'-P-CCNC: 20 U/L (ref 10–37)
BASOPHILS # BLD AUTO: 0.1 X10'3 (ref 0–0.2)
BASOPHILS NFR BLD AUTO: 0.7 % (ref 0–1)
BILIRUB SERPL-MCNC: 0.2 MG/DL (ref 0–1)
BUN SERPL-MCNC: 43 MG/DL (ref 7–18)
BUN/CREAT SERPL: 7.6 (ref 5.4–32)
CALCIUM SERPL-MCNC: 9.1 MG/DL (ref 8.5–10.1)
CHLORIDE SERPL-SCNC: 99 MMOL/L (ref 99–107)
CO2 SERPL-SCNC: 31.9 MMOL/L (ref 24–32)
CREAT SERPL-MCNC: 5.68 MG/DL (ref 0.6–1.1)
EOSINOPHIL # BLD AUTO: 0.1 X10'3 (ref 0–0.9)
EOSINOPHIL NFR BLD AUTO: 1.1 % (ref 0–6)
ERYTHROCYTE [DISTWIDTH] IN BLOOD BY AUTOMATED COUNT: 14.5 % (ref 11.5–14.5)
GFR SERPL CREATININE-BSD FRML MDRD: 10 ML/MIN
GLUCOSE SERPL-MCNC: 73 MG/DL (ref 70–104)
HCT VFR BLD AUTO: 32.6 % (ref 42–52)
HGB BLD-MCNC: 10.8 G/DL (ref 14–17.9)
INR PPP: 1 INR
LYMPHOCYTES # BLD AUTO: 1.9 X10'3 (ref 1.1–4.8)
LYMPHOCYTES NFR BLD AUTO: 25.8 % (ref 21–51)
MCH RBC QN AUTO: 32.7 PG (ref 27–31)
MCHC RBC AUTO-ENTMCNC: 33.2 G/DL (ref 33–36.5)
MCV RBC AUTO: 98.5 FL (ref 78–98)
MONOCYTES # BLD AUTO: 0.9 X10'3 (ref 0–0.9)
MONOCYTES NFR BLD AUTO: 12 % (ref 2–12)
NEUTROPHILS # BLD AUTO: 4.5 X10'3 (ref 1.8–7.7)
NEUTROPHILS NFR BLD AUTO: 60.4 % (ref 42–75)
PLATELET # BLD AUTO: 283 X10'3 (ref 140–440)
PMV BLD AUTO: 6.6 FL (ref 7.4–10.4)
POTASSIUM SERPL-SCNC: 4.3 MMOL/L (ref 3.4–5.1)
PROT SERPL-MCNC: 7.6 G/DL (ref 6.4–8.2)
PROTHROMBIN TIME: 10.8 SECONDS (ref 9–12)
RBC # BLD AUTO: 3.31 X10'6 (ref 4.7–6.1)
SODIUM SERPL-SCNC: 140 MMOL/L (ref 135–145)

## 2021-01-19 ENCOUNTER — HOSPITAL ENCOUNTER (OUTPATIENT)
Dept: HOSPITAL 94 - PAS | Age: 61
Discharge: HOME | End: 2021-01-19
Attending: SURGERY
Payer: MEDICARE

## 2021-01-19 VITALS — HEIGHT: 66 IN | BODY MASS INDEX: 26.84 KG/M2 | WEIGHT: 167 LBS

## 2021-01-19 VITALS — DIASTOLIC BLOOD PRESSURE: 58 MMHG | SYSTOLIC BLOOD PRESSURE: 118 MMHG

## 2021-01-19 VITALS — DIASTOLIC BLOOD PRESSURE: 74 MMHG | SYSTOLIC BLOOD PRESSURE: 141 MMHG

## 2021-01-19 VITALS — SYSTOLIC BLOOD PRESSURE: 125 MMHG | DIASTOLIC BLOOD PRESSURE: 66 MMHG

## 2021-01-19 VITALS — SYSTOLIC BLOOD PRESSURE: 154 MMHG | DIASTOLIC BLOOD PRESSURE: 72 MMHG

## 2021-01-19 VITALS — SYSTOLIC BLOOD PRESSURE: 129 MMHG | DIASTOLIC BLOOD PRESSURE: 68 MMHG

## 2021-01-19 VITALS — SYSTOLIC BLOOD PRESSURE: 126 MMHG | DIASTOLIC BLOOD PRESSURE: 63 MMHG

## 2021-01-19 DIAGNOSIS — K21.9: ICD-10-CM

## 2021-01-19 DIAGNOSIS — I48.91: ICD-10-CM

## 2021-01-19 DIAGNOSIS — E78.5: ICD-10-CM

## 2021-01-19 DIAGNOSIS — N18.6: ICD-10-CM

## 2021-01-19 DIAGNOSIS — E03.9: ICD-10-CM

## 2021-01-19 DIAGNOSIS — E11.40: ICD-10-CM

## 2021-01-19 DIAGNOSIS — Z79.01: ICD-10-CM

## 2021-01-19 DIAGNOSIS — Z99.2: ICD-10-CM

## 2021-01-19 DIAGNOSIS — Z87.891: ICD-10-CM

## 2021-01-19 DIAGNOSIS — Z20.822: ICD-10-CM

## 2021-01-19 DIAGNOSIS — Z84.1: ICD-10-CM

## 2021-01-19 DIAGNOSIS — I12.0: ICD-10-CM

## 2021-01-19 DIAGNOSIS — Z79.899: ICD-10-CM

## 2021-01-19 DIAGNOSIS — K40.30: Primary | ICD-10-CM

## 2021-01-19 DIAGNOSIS — Z87.19: ICD-10-CM

## 2021-01-19 DIAGNOSIS — Z83.3: ICD-10-CM

## 2021-01-19 DIAGNOSIS — Z82.49: ICD-10-CM

## 2021-01-19 DIAGNOSIS — E11.22: ICD-10-CM

## 2021-01-19 DIAGNOSIS — E11.43: ICD-10-CM

## 2021-01-19 DIAGNOSIS — K31.84: ICD-10-CM

## 2021-01-19 DIAGNOSIS — D64.9: ICD-10-CM

## 2021-01-19 LAB
ALBUMIN SERPL BCP-MCNC: 3.7 G/DL (ref 3.4–5)
ALBUMIN/GLOB SERPL: 1 {RATIO} (ref 1.1–1.5)
ALP SERPL-CCNC: 131 IU/L (ref 46–116)
ALT SERPL W P-5'-P-CCNC: 35 U/L (ref 12–78)
ANION GAP SERPL CALCULATED.3IONS-SCNC: 4 MMOL/L (ref 8–16)
AST SERPL W P-5'-P-CCNC: 22 U/L (ref 10–37)
BILIRUB SERPL-MCNC: 0.3 MG/DL (ref 0.1–1)
BUN SERPL-MCNC: 31 MG/DL (ref 7–18)
BUN/CREAT SERPL: 6.6 (ref 5.4–32)
CALCIUM SERPL-MCNC: 8.7 MG/DL (ref 8.5–10.1)
CHLORIDE SERPL-SCNC: 99 MMOL/L (ref 99–107)
CO2 SERPL-SCNC: 36.4 MMOL/L (ref 24–32)
CREAT SERPL-MCNC: 4.68 MG/DL (ref 0.6–1.1)
GFR SERPL CREATININE-BSD FRML MDRD: 13 ML/MIN
GLUCOSE SERPL-MCNC: 146 MG/DL (ref 70–104)
POTASSIUM SERPL-SCNC: 4.6 MMOL/L (ref 3.5–5.1)
PROT SERPL-MCNC: 7.5 G/DL (ref 6.4–8.2)
SODIUM SERPL-SCNC: 139 MMOL/L (ref 135–145)

## 2021-01-19 PROCEDURE — 82948 REAGENT STRIP/BLOOD GLUCOSE: CPT

## 2021-01-19 PROCEDURE — 49650 LAP ING HERNIA REPAIR INIT: CPT

## 2021-01-19 PROCEDURE — 36415 COLL VENOUS BLD VENIPUNCTURE: CPT

## 2021-01-19 PROCEDURE — 87635 SARS-COV-2 COVID-19 AMP PRB: CPT

## 2021-01-19 PROCEDURE — 93005 ELECTROCARDIOGRAM TRACING: CPT

## 2021-01-19 PROCEDURE — 85730 THROMBOPLASTIN TIME PARTIAL: CPT

## 2021-01-19 PROCEDURE — 80053 COMPREHEN METABOLIC PANEL: CPT

## 2021-01-19 PROCEDURE — 85610 PROTHROMBIN TIME: CPT

## 2021-01-19 PROCEDURE — 85025 COMPLETE CBC W/AUTO DIFF WBC: CPT

## 2021-01-19 PROCEDURE — 76937 US GUIDE VASCULAR ACCESS: CPT

## 2021-01-19 NOTE — NUR
PATIENT A&OX4, DENIES PAIN, V/S WNL, NEUROVASCULAR CHECKS INTACT, 20G PIV RUE D/C, SCD OFF,  
BANDAIDS TO LAP SIGHTS OF ABDOMEN CDI.. I HAVE REVIEWED D/C INSTRUCTIONS WITH PATIENT AND 
FAMILY HAVE VERBALIZED UNDERSTANDING.PATIENT WAS D/C HOME WITH ALL BELONGINGS AND FAMILY 
GAVE TRANSPORT HOME.

## 2021-09-17 ENCOUNTER — HOSPITAL ENCOUNTER (EMERGENCY)
Dept: HOSPITAL 94 - ER | Age: 61
Discharge: HOME | End: 2021-09-17
Payer: MEDICARE

## 2021-09-17 VITALS — DIASTOLIC BLOOD PRESSURE: 93 MMHG | SYSTOLIC BLOOD PRESSURE: 183 MMHG

## 2021-09-17 VITALS — WEIGHT: 138.23 LBS | HEIGHT: 65 IN | BODY MASS INDEX: 23.03 KG/M2

## 2021-09-17 DIAGNOSIS — I11.0: ICD-10-CM

## 2021-09-17 DIAGNOSIS — E11.43: ICD-10-CM

## 2021-09-17 DIAGNOSIS — N18.9: ICD-10-CM

## 2021-09-17 DIAGNOSIS — G89.29: ICD-10-CM

## 2021-09-17 DIAGNOSIS — F12.90: ICD-10-CM

## 2021-09-17 DIAGNOSIS — F15.90: ICD-10-CM

## 2021-09-17 DIAGNOSIS — R11.2: Primary | ICD-10-CM

## 2021-09-17 DIAGNOSIS — K21.9: ICD-10-CM

## 2021-09-17 DIAGNOSIS — E03.9: ICD-10-CM

## 2021-09-17 DIAGNOSIS — E11.65: ICD-10-CM

## 2021-09-17 DIAGNOSIS — I12.9: ICD-10-CM

## 2021-09-17 DIAGNOSIS — E11.22: ICD-10-CM

## 2021-09-17 DIAGNOSIS — Z90.49: ICD-10-CM

## 2021-09-17 DIAGNOSIS — E78.00: ICD-10-CM

## 2021-09-17 DIAGNOSIS — I50.9: ICD-10-CM

## 2021-09-17 LAB
ALBUMIN SERPL BCP-MCNC: 4.5 G/DL (ref 3.4–5)
ALBUMIN/GLOB SERPL: 1.3 {RATIO} (ref 1.1–1.5)
ALP SERPL-CCNC: 84 IU/L (ref 46–116)
ALT SERPL W P-5'-P-CCNC: 49 U/L (ref 12–78)
ANION GAP SERPL CALCULATED.3IONS-SCNC: 20 MMOL/L (ref 8–16)
AST SERPL W P-5'-P-CCNC: 53 U/L (ref 10–37)
BASOPHILS # BLD AUTO: 0 X10'3 (ref 0–0.2)
BASOPHILS NFR BLD AUTO: 0.3 % (ref 0–1)
BILIRUB SERPL-MCNC: 1.1 MG/DL (ref 0.1–1)
BUN SERPL-MCNC: 40 MG/DL (ref 7–18)
BUN/CREAT SERPL: 6.2 (ref 5.4–32)
CALCIUM SERPL-MCNC: 10.2 MG/DL (ref 8.5–10.1)
CHLORIDE SERPL-SCNC: 95 MMOL/L (ref 99–107)
CO2 SERPL-SCNC: 26.9 MMOL/L (ref 24–32)
CREAT SERPL-MCNC: 6.44 MG/DL (ref 0.6–1.1)
EOSINOPHIL # BLD AUTO: 0 X10'3 (ref 0–0.9)
EOSINOPHIL NFR BLD AUTO: 0 % (ref 0–6)
ERYTHROCYTE [DISTWIDTH] IN BLOOD BY AUTOMATED COUNT: 15 % (ref 11.5–14.5)
GFR SERPL CREATININE-BSD FRML MDRD: 9 ML/MIN
GLUCOSE SERPL-MCNC: 192 MG/DL (ref 70–104)
HCT VFR BLD AUTO: 36.4 % (ref 42–52)
HGB BLD-MCNC: 12.2 G/DL (ref 14–17.9)
LIPASE SERPL-CCNC: 74 U/L (ref 73–393)
LYMPHOCYTES # BLD AUTO: 0.9 X10'3 (ref 1.1–4.8)
LYMPHOCYTES NFR BLD AUTO: 7.8 % (ref 21–51)
MCH RBC QN AUTO: 32.7 PG (ref 27–31)
MCHC RBC AUTO-ENTMCNC: 33.4 G/DL (ref 33–36.5)
MCV RBC AUTO: 98 FL (ref 78–98)
MONOCYTES # BLD AUTO: 0.8 X10'3 (ref 0–0.9)
MONOCYTES NFR BLD AUTO: 7.3 % (ref 2–12)
NEUTROPHILS # BLD AUTO: 9.5 X10'3 (ref 1.8–7.7)
NEUTROPHILS NFR BLD AUTO: 84.6 % (ref 42–75)
PLATELET # BLD AUTO: 267 X10'3 (ref 140–440)
PMV BLD AUTO: 7 FL (ref 7.4–10.4)
POTASSIUM SERPL-SCNC: 3.2 MMOL/L (ref 3.5–5.1)
PROT SERPL-MCNC: 8.1 G/DL (ref 6.4–8.2)
RBC # BLD AUTO: 3.71 X10'6 (ref 4.7–6.1)
SODIUM SERPL-SCNC: 142 MMOL/L (ref 135–145)
WBC # BLD AUTO: 11.2 X10'3 (ref 4.5–11)

## 2021-09-17 PROCEDURE — 83690 ASSAY OF LIPASE: CPT

## 2021-09-17 PROCEDURE — 85025 COMPLETE CBC W/AUTO DIFF WBC: CPT

## 2021-09-17 PROCEDURE — 74176 CT ABD & PELVIS W/O CONTRAST: CPT

## 2021-09-17 PROCEDURE — 96374 THER/PROPH/DIAG INJ IV PUSH: CPT

## 2021-09-17 PROCEDURE — 96361 HYDRATE IV INFUSION ADD-ON: CPT

## 2021-09-17 PROCEDURE — 36415 COLL VENOUS BLD VENIPUNCTURE: CPT

## 2021-09-17 PROCEDURE — 96375 TX/PRO/DX INJ NEW DRUG ADDON: CPT

## 2021-09-17 PROCEDURE — 99284 EMERGENCY DEPT VISIT MOD MDM: CPT

## 2021-09-17 PROCEDURE — 80053 COMPREHEN METABOLIC PANEL: CPT

## 2022-06-10 ENCOUNTER — HOSPITAL ENCOUNTER (EMERGENCY)
Dept: HOSPITAL 94 - ER | Age: 62
Discharge: HOME | End: 2022-06-10
Payer: MEDICARE

## 2022-06-10 VITALS — WEIGHT: 140 LBS | HEIGHT: 66 IN | BODY MASS INDEX: 22.5 KG/M2

## 2022-06-10 VITALS — SYSTOLIC BLOOD PRESSURE: 177 MMHG | DIASTOLIC BLOOD PRESSURE: 75 MMHG

## 2022-06-10 DIAGNOSIS — N18.6: ICD-10-CM

## 2022-06-10 DIAGNOSIS — E13.22: ICD-10-CM

## 2022-06-10 DIAGNOSIS — Z79.899: ICD-10-CM

## 2022-06-10 DIAGNOSIS — S09.90XA: ICD-10-CM

## 2022-06-10 DIAGNOSIS — R11.2: Primary | ICD-10-CM

## 2022-06-10 DIAGNOSIS — I13.2: ICD-10-CM

## 2022-06-10 DIAGNOSIS — K31.84: ICD-10-CM

## 2022-06-10 DIAGNOSIS — E11.43: ICD-10-CM

## 2022-06-10 DIAGNOSIS — F15.10: ICD-10-CM

## 2022-06-10 DIAGNOSIS — F12.10: ICD-10-CM

## 2022-06-10 DIAGNOSIS — K21.9: ICD-10-CM

## 2022-06-10 DIAGNOSIS — F32.9: ICD-10-CM

## 2022-06-10 DIAGNOSIS — G89.29: ICD-10-CM

## 2022-06-10 PROCEDURE — 70450 CT HEAD/BRAIN W/O DYE: CPT

## 2022-06-10 PROCEDURE — 96374 THER/PROPH/DIAG INJ IV PUSH: CPT

## 2022-06-10 PROCEDURE — 71045 X-RAY EXAM CHEST 1 VIEW: CPT

## 2022-06-10 PROCEDURE — 99285 EMERGENCY DEPT VISIT HI MDM: CPT

## 2022-06-10 PROCEDURE — 82948 REAGENT STRIP/BLOOD GLUCOSE: CPT

## 2022-09-01 LAB
ALBUMIN SERPL BCP-MCNC: 4.2 G/DL (ref 3.4–5)
ANION GAP SERPL CALCULATED.3IONS-SCNC: 16 MMOL/L (ref 8–16)
APTT PPP: 31 SECONDS (ref 22–32)
BASOPHILS # BLD AUTO: 0 X10'3 (ref 0–0.2)
BASOPHILS NFR BLD AUTO: 0.1 % (ref 0–1)
BUN SERPL-MCNC: 18 MG/DL (ref 7–18)
BUN/CREAT SERPL: 6.1 (ref 5.4–32)
CALCIUM SERPL-MCNC: 10.1 MG/DL (ref 8.5–10.1)
CHLORIDE SERPL-SCNC: 101 MMOL/L (ref 99–107)
CO2 SERPL-SCNC: 23.5 MMOL/L (ref 24–32)
CREAT SERPL-MCNC: 2.97 MG/DL (ref 0.6–1.1)
EOSINOPHIL # BLD AUTO: 0 X10'3 (ref 0–0.9)
EOSINOPHIL NFR BLD AUTO: 0 % (ref 0–6)
ERYTHROCYTE [DISTWIDTH] IN BLOOD BY AUTOMATED COUNT: 14.3 % (ref 11.5–14.5)
GFR SERPL CREATININE-BSD FRML MDRD: 22 ML/MIN
GLUCOSE SERPL-MCNC: 164 MG/DL (ref 70–104)
HCT VFR BLD AUTO: 39.4 % (ref 42–52)
HGB BLD-MCNC: 12.6 G/DL (ref 14–17.9)
LYMPHOCYTES # BLD AUTO: 0.6 X10'3 (ref 1.1–4.8)
LYMPHOCYTES NFR BLD AUTO: 3.7 % (ref 21–51)
MCH RBC QN AUTO: 32.1 PG (ref 27–31)
MCHC RBC AUTO-ENTMCNC: 31.9 G/DL (ref 33–36.5)
MCV RBC AUTO: 100.5 FL (ref 78–98)
MONOCYTES # BLD AUTO: 0.8 X10'3 (ref 0–0.9)
MONOCYTES NFR BLD AUTO: 5.6 % (ref 2–12)
NEUTROPHILS # BLD AUTO: 13.7 X10'3 (ref 1.8–7.7)
NEUTROPHILS NFR BLD AUTO: 90.6 % (ref 42–75)
PLATELET # BLD AUTO: 251 X10'3 (ref 140–440)
PMV BLD AUTO: 7.5 FL (ref 7.4–10.4)
POTASSIUM SERPL-SCNC: 4.2 MMOL/L (ref 3.5–5.1)
RBC # BLD AUTO: 3.92 X10'6 (ref 4.7–6.1)
SODIUM SERPL-SCNC: 140 MMOL/L (ref 135–145)
WBC # BLD AUTO: 15.1 X10'3 (ref 4.5–11)

## 2022-09-06 ENCOUNTER — HOSPITAL ENCOUNTER (OUTPATIENT)
Dept: HOSPITAL 94 - SSTAY O | Age: 62
Discharge: HOME | End: 2022-09-06
Attending: STUDENT IN AN ORGANIZED HEALTH CARE EDUCATION/TRAINING PROGRAM
Payer: MEDICARE

## 2022-09-06 VITALS — DIASTOLIC BLOOD PRESSURE: 86 MMHG | SYSTOLIC BLOOD PRESSURE: 180 MMHG

## 2022-09-06 VITALS — SYSTOLIC BLOOD PRESSURE: 142 MMHG | DIASTOLIC BLOOD PRESSURE: 68 MMHG

## 2022-09-06 VITALS — SYSTOLIC BLOOD PRESSURE: 172 MMHG | DIASTOLIC BLOOD PRESSURE: 90 MMHG

## 2022-09-06 VITALS — DIASTOLIC BLOOD PRESSURE: 95 MMHG | SYSTOLIC BLOOD PRESSURE: 188 MMHG

## 2022-09-06 VITALS — DIASTOLIC BLOOD PRESSURE: 81 MMHG | SYSTOLIC BLOOD PRESSURE: 162 MMHG

## 2022-09-06 VITALS — DIASTOLIC BLOOD PRESSURE: 78 MMHG | SYSTOLIC BLOOD PRESSURE: 139 MMHG

## 2022-09-06 VITALS — WEIGHT: 144.4 LBS | BODY MASS INDEX: 23.21 KG/M2 | HEIGHT: 66 IN

## 2022-09-06 VITALS — DIASTOLIC BLOOD PRESSURE: 86 MMHG | SYSTOLIC BLOOD PRESSURE: 157 MMHG

## 2022-09-06 DIAGNOSIS — R94.39: ICD-10-CM

## 2022-09-06 DIAGNOSIS — N18.9: ICD-10-CM

## 2022-09-06 DIAGNOSIS — E03.9: ICD-10-CM

## 2022-09-06 DIAGNOSIS — I73.9: ICD-10-CM

## 2022-09-06 DIAGNOSIS — I12.0: ICD-10-CM

## 2022-09-06 DIAGNOSIS — I25.10: Primary | ICD-10-CM

## 2022-09-06 DIAGNOSIS — I48.91: ICD-10-CM

## 2022-09-06 DIAGNOSIS — Z99.2: ICD-10-CM

## 2022-09-06 DIAGNOSIS — N18.6: ICD-10-CM

## 2022-09-06 DIAGNOSIS — E11.51: ICD-10-CM

## 2022-09-06 DIAGNOSIS — E11.22: ICD-10-CM

## 2022-09-06 LAB
ALBUMIN SERPL BCP-MCNC: 3.4 G/DL (ref 3.4–5)
ANION GAP SERPL CALCULATED.3IONS-SCNC: 9 MMOL/L (ref 8–16)
BASOPHILS # BLD AUTO: 0 X10'3 (ref 0–0.2)
BASOPHILS NFR BLD AUTO: 0.2 % (ref 0–1)
BUN SERPL-MCNC: 14 MG/DL (ref 7–18)
BUN/CREAT SERPL: 4.7 (ref 5.4–32)
CALCIUM SERPL-MCNC: 9.4 MG/DL (ref 8.5–10.1)
CHLORIDE SERPL-SCNC: 102 MMOL/L (ref 99–107)
CO2 SERPL-SCNC: 29.9 MMOL/L (ref 24–32)
CREAT SERPL-MCNC: 2.98 MG/DL (ref 0.6–1.1)
EOSINOPHIL # BLD AUTO: 0 X10'3 (ref 0–0.9)
EOSINOPHIL NFR BLD AUTO: 0 % (ref 0–6)
ERYTHROCYTE [DISTWIDTH] IN BLOOD BY AUTOMATED COUNT: 13.9 % (ref 11.5–14.5)
GFR SERPL CREATININE-BSD FRML MDRD: 22 ML/MIN
GLUCOSE SERPL-MCNC: 46 MG/DL (ref 70–104)
HBA1C MFR BLD: 5.8 % (ref 4.5–6.2)
HCT VFR BLD AUTO: 36.9 % (ref 42–52)
HGB BLD-MCNC: 12.3 G/DL (ref 14–17.9)
LYMPHOCYTES # BLD AUTO: 0.8 X10'3 (ref 1.1–4.8)
LYMPHOCYTES NFR BLD AUTO: 13.1 % (ref 21–51)
MCH RBC QN AUTO: 32.8 PG (ref 27–31)
MCHC RBC AUTO-ENTMCNC: 33.2 G/DL (ref 33–36.5)
MCV RBC AUTO: 98.7 FL (ref 78–98)
MONOCYTES # BLD AUTO: 0.8 X10'3 (ref 0–0.9)
MONOCYTES NFR BLD AUTO: 12.7 % (ref 2–12)
NEUTROPHILS # BLD AUTO: 4.5 X10'3 (ref 1.8–7.7)
NEUTROPHILS NFR BLD AUTO: 74 % (ref 42–75)
PLATELET # BLD AUTO: 188 X10'3 (ref 140–440)
PMV BLD AUTO: 6.9 FL (ref 7.4–10.4)
POTASSIUM SERPL-SCNC: 3.3 MMOL/L (ref 3.5–5.1)
RBC # BLD AUTO: 3.74 X10'6 (ref 4.7–6.1)
SODIUM SERPL-SCNC: 141 MMOL/L (ref 135–145)
WBC # BLD AUTO: 6.1 X10'3 (ref 4.5–11)

## 2022-09-06 PROCEDURE — 93458 L HRT ARTERY/VENTRICLE ANGIO: CPT

## 2022-09-06 PROCEDURE — 85610 PROTHROMBIN TIME: CPT

## 2022-09-06 PROCEDURE — 85730 THROMBOPLASTIN TIME PARTIAL: CPT

## 2022-09-06 PROCEDURE — 85025 COMPLETE CBC W/AUTO DIFF WBC: CPT

## 2022-09-06 PROCEDURE — 83036 HEMOGLOBIN GLYCOSYLATED A1C: CPT

## 2022-09-06 PROCEDURE — 93005 ELECTROCARDIOGRAM TRACING: CPT

## 2022-09-06 PROCEDURE — 80048 BASIC METABOLIC PNL TOTAL CA: CPT

## 2022-09-06 PROCEDURE — 82948 REAGENT STRIP/BLOOD GLUCOSE: CPT

## 2022-09-06 PROCEDURE — 36415 COLL VENOUS BLD VENIPUNCTURE: CPT

## 2022-09-06 PROCEDURE — 99152 MOD SED SAME PHYS/QHP 5/>YRS: CPT

## 2022-09-06 NOTE — NUR
PATIENT RETURNED FROM CATH LAB - CHECKED CAPILLARY BLOOD GLUCOSE - RESULTS 50.  REPORTED TO 
PRIMARY NURSE JASSON MOCK.

## 2023-07-05 ENCOUNTER — HOSPITAL ENCOUNTER (OUTPATIENT)
Dept: HOSPITAL 94 - SSTAY O | Age: 63
Discharge: HOME | End: 2023-07-05
Attending: RADIOLOGY
Payer: MEDICARE

## 2023-07-05 VITALS — SYSTOLIC BLOOD PRESSURE: 123 MMHG | DIASTOLIC BLOOD PRESSURE: 64 MMHG

## 2023-07-05 VITALS — HEIGHT: 66 IN | BODY MASS INDEX: 23.74 KG/M2 | WEIGHT: 147.71 LBS

## 2023-07-05 VITALS — SYSTOLIC BLOOD PRESSURE: 146 MMHG | DIASTOLIC BLOOD PRESSURE: 66 MMHG

## 2023-07-05 VITALS — SYSTOLIC BLOOD PRESSURE: 138 MMHG | DIASTOLIC BLOOD PRESSURE: 69 MMHG

## 2023-07-05 VITALS — DIASTOLIC BLOOD PRESSURE: 58 MMHG | SYSTOLIC BLOOD PRESSURE: 116 MMHG

## 2023-07-05 VITALS — SYSTOLIC BLOOD PRESSURE: 117 MMHG | DIASTOLIC BLOOD PRESSURE: 78 MMHG

## 2023-07-05 VITALS — SYSTOLIC BLOOD PRESSURE: 131 MMHG | DIASTOLIC BLOOD PRESSURE: 67 MMHG

## 2023-07-05 VITALS — DIASTOLIC BLOOD PRESSURE: 61 MMHG | SYSTOLIC BLOOD PRESSURE: 129 MMHG

## 2023-07-05 VITALS — SYSTOLIC BLOOD PRESSURE: 163 MMHG | DIASTOLIC BLOOD PRESSURE: 74 MMHG
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
## 2023-07-05 DIAGNOSIS — T82.868A: Primary | ICD-10-CM

## 2023-07-05 DIAGNOSIS — Z82.49: ICD-10-CM
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
## 2023-07-05 DIAGNOSIS — N18.6: ICD-10-CM

## 2023-07-05 DIAGNOSIS — F12.90: ICD-10-CM

## 2023-07-05 DIAGNOSIS — Z90.49: ICD-10-CM

## 2023-07-05 DIAGNOSIS — Z79.899: ICD-10-CM

## 2023-07-05 DIAGNOSIS — I48.91: ICD-10-CM

## 2023-07-05 DIAGNOSIS — E11.22: ICD-10-CM

## 2023-07-05 DIAGNOSIS — Z82.3: ICD-10-CM

## 2023-07-05 DIAGNOSIS — F15.90: ICD-10-CM

## 2023-07-05 DIAGNOSIS — Z83.3: ICD-10-CM

## 2023-07-05 DIAGNOSIS — Z79.01: ICD-10-CM

## 2023-07-05 LAB
ALBUMIN SERPL BCP-MCNC: 3.6 G/DL (ref 3.4–5)
ANION GAP SERPL CALCULATED.3IONS-SCNC: 8 MMOL/L (ref 8–16)
BASOPHILS # BLD AUTO: 0 X10'3 (ref 0–0.2)
BASOPHILS NFR BLD AUTO: 0.5 % (ref 0–1)
BUN SERPL-MCNC: 33 MG/DL (ref 7–18)
BUN/CREAT SERPL: 6.3 (ref 10–20)
CALCIUM SERPL-MCNC: 8.8 MG/DL (ref 8.5–10.1)
CHLORIDE SERPL-SCNC: 104 MMOL/L (ref 99–107)
CO2 SERPL-SCNC: 26 MMOL/L (ref 24–32)
CREAT SERPL-MCNC: 5.25 MG/DL (ref 0.6–1.1)
EOSINOPHIL # BLD AUTO: 0 X10'3 (ref 0–0.9)
EOSINOPHIL NFR BLD AUTO: 0 % (ref 0–6)
ERYTHROCYTE [DISTWIDTH] IN BLOOD BY AUTOMATED COUNT: 16.2 % (ref 11.5–14.5)
GFR SERPL CREATININE-BSD FRML MDRD: 11 ML/MIN
GLUCOSE SERPL-MCNC: 78 MG/DL (ref 70–104)
HCT VFR BLD AUTO: 37.6 % (ref 42–52)
HGB BLD-MCNC: 12.1 G/DL (ref 14–17.9)
LYMPHOCYTES # BLD AUTO: 0.9 X10'3 (ref 1.1–4.8)
LYMPHOCYTES NFR BLD AUTO: 15.8 % (ref 21–51)
MCH RBC QN AUTO: 32.4 PG (ref 27–31)
MCHC RBC AUTO-ENTMCNC: 32.2 G/DL (ref 33–36.5)
MCV RBC AUTO: 100.5 FL (ref 78–98)
MONOCYTES # BLD AUTO: 0.8 X10'3 (ref 0–0.9)
MONOCYTES NFR BLD AUTO: 13.7 % (ref 2–12)
NEUTROPHILS # BLD AUTO: 4.1 X10'3 (ref 1.8–7.7)
NEUTROPHILS NFR BLD AUTO: 70 % (ref 42–75)
PLATELET # BLD AUTO: 179 X10'3 (ref 140–440)
PMV BLD AUTO: 7.2 FL (ref 7.4–10.4)
POTASSIUM SERPL-SCNC: 3.8 MMOL/L (ref 3.5–5.1)
RBC # BLD AUTO: 3.74 X10'6 (ref 4.7–6.1)
SODIUM SERPL-SCNC: 138 MMOL/L (ref 135–145)
WBC # BLD AUTO: 5.9 X10'3 (ref 4.5–11)

## 2023-07-05 PROCEDURE — 99152 MOD SED SAME PHYS/QHP 5/>YRS: CPT

## 2023-07-05 PROCEDURE — 36415 COLL VENOUS BLD VENIPUNCTURE: CPT

## 2023-07-05 PROCEDURE — 36902 INTRO CATH DIALYSIS CIRCUIT: CPT

## 2023-07-05 PROCEDURE — 85025 COMPLETE CBC W/AUTO DIFF WBC: CPT

## 2023-07-05 PROCEDURE — 85610 PROTHROMBIN TIME: CPT

## 2023-07-05 PROCEDURE — 80048 BASIC METABOLIC PNL TOTAL CA: CPT

## 2023-07-05 PROCEDURE — 99153 MOD SED SAME PHYS/QHP EA: CPT

## 2023-11-10 NOTE — NUR
Dr Siegel placed new order to switch frequency of Amio 200 mg from daily to BID. Clicked 
"nonadmin" on 1255 dose after speaking to MD because pt already received AM dose, next dose 
will be at 2000 Shruthi Ramos is a 61 y.o. female on day 0 of admission presenting with Nausea & vomiting.      Subjective   N/V minimally improved. Diarrhea minimally improved.       Objective     Last Recorded Vitals  /74   Pulse 78   Temp 36.4 °C (97.5 °F)   Resp 16   Wt 102 kg (225 lb)   SpO2 99%   Intake/Output last 3 Shifts:  No intake or output data in the 24 hours ending 11/09/23 2244    Admission Weight  Weight: 102 kg (225 lb) (11/06/23 1108)    Daily Weight  11/06/23 : 102 kg (225 lb)    Image Results  XR chest 2 views  Narrative: Interpreted By:  Reese Mcfarland,   STUDY:  XR CHEST 2 VIEWS;  11/6/2023 6:59 pm      INDICATION:  Signs/Symptoms:r/o pna.      COMPARISON:  10/11/2020      ACCESSION NUMBER(S):  [id number]      ORDERING CLINICIAN:  JENNA MORAES      FINDINGS:      Right-sided implanted port is in stable position. The cardiac  silhouette is unremarkable. Costophrenic angles are sharp. Lungs are  clear. The trachea is midline. There is no pneumothorax. No acute  osseous abnormality is seen.      Impression: 1.  No active cardiopulmonary process.          Signed by: Reese Mcfarland 11/6/2023 7:16 PM  Dictation workstation:   [id number]      Physical Exam  Gen - NAD  ENT - NC  Neck - No JVD  H - S1S2  L - Decreased BS  Abd - Soft  Ext - W & D  Neuro - Awake and Alert  Relevant Results    Scheduled medications  atorvastatin, 20 mg, oral, Nightly  cholecalciferol, 1,000 Units, oral, q AM  cyanocobalamin, 1,000 mcg, oral, Daily  DULoxetine, 60 mg, oral, Daily before breakfast  estradiol, 3 g, vaginal, q3 days  lactobacillus acidophilus, 1 tablet, oral, Daily  metoprolol succinate XL, 50 mg, oral, Daily before breakfast  multivitamin with minerals, 1 tablet, oral, Daily  pantoprazole, 40 mg, intravenous, Daily  scopolamine, 1 patch, transdermal, q72h  traZODone, 50 mg, oral, Nightly      Continuous medications  sodium chloride 0.9%, 100 mL/hr, Last Rate: 100 mL/hr (11/09/23 1259)      PRN  medications  PRN medications: acetaminophen **OR** acetaminophen **OR** acetaminophen, loperamide, metoclopramide, ondansetron, promethazine, white petrolatum  Results for orders placed or performed during the hospital encounter of 11/06/23 (from the past 24 hour(s))   Comprehensive metabolic panel   Result Value Ref Range    Glucose 73 (L) 74 - 99 mg/dL    Sodium 137 136 - 145 mmol/L    Potassium 3.3 (L) 3.5 - 5.3 mmol/L    Chloride 108 (H) 98 - 107 mmol/L    Bicarbonate 16 (L) 21 - 32 mmol/L    Anion Gap 16 10 - 20 mmol/L    Urea Nitrogen 6 6 - 23 mg/dL    Creatinine 0.57 0.50 - 1.05 mg/dL    eGFR >90 >60 mL/min/1.73m*2    Calcium 7.6 (L) 8.6 - 10.3 mg/dL    Albumin 2.7 (L) 3.4 - 5.0 g/dL    Alkaline Phosphatase 77 33 - 110 U/L    Total Protein 4.2 (L) 6.4 - 8.2 g/dL    AST 41 (H) 9 - 39 U/L    Bilirubin, Total 0.7 0.0 - 1.2 mg/dL    ALT 42 7 - 45 U/L   CBC and Auto Differential   Result Value Ref Range    WBC 8.9 4.4 - 11.3 x10*3/uL    nRBC 0.9 (H) 0.0 - 0.0 /100 WBCs    RBC 4.26 4.00 - 5.20 x10*6/uL    Hemoglobin 11.7 (L) 12.0 - 16.0 g/dL    Hematocrit 36.1 36.0 - 46.0 %    MCV 85 80 - 100 fL    MCH 27.5 26.0 - 34.0 pg    MCHC 32.4 32.0 - 36.0 g/dL    RDW 16.0 (H) 11.5 - 14.5 %    Platelets 150 150 - 450 x10*3/uL    Immature Granulocytes %, Automated 11.5 (H) 0.0 - 0.9 %    Immature Granulocytes Absolute, Automated 1.02 (H) 0.00 - 0.70 x10*3/uL   Magnesium   Result Value Ref Range    Magnesium 1.87 1.60 - 2.40 mg/dL   Manual Differential   Result Value Ref Range    Neutrophils %, Manual 66.0 40.0 - 80.0 %    Bands %, Manual 8.0 0.0 - 5.0 %    Lymphocytes %, Manual 11.0 13.0 - 44.0 %    Monocytes %, Manual 15.0 2.0 - 10.0 %    Eosinophils %, Manual 0.0 0.0 - 6.0 %    Basophils %, Manual 0.0 0.0 - 2.0 %    Seg Neutrophils Absolute, Manual 5.87 1.20 - 7.00 x10*3/uL    Bands Absolute, Manual 0.71 (H) 0.00 - 0.70 x10*3/uL    Lymphocytes Absolute, Manual 0.98 (L) 1.20 - 4.80 x10*3/uL    Monocytes Absolute, Manual 1.34  (H) 0.10 - 1.00 x10*3/uL    Eosinophils Absolute, Manual 0.00 0.00 - 0.70 x10*3/uL    Basophils Absolute, Manual 0.00 0.00 - 0.10 x10*3/uL    Total Cells Counted 100     Neutrophils Absolute, Manual 6.58 1.20 - 7.70 x10*3/uL    RBC Morphology See Below     Ovalocytes Few     Teardrop Cells Few                 Assessment/Plan      HTN  HLD  Gastric Ca  N/V/D - Add Scopolamine Patch  Continue Tx and IV Fluids            Principal Problem:    Nausea & vomiting                  Gaudencio Burks, DO

## 2025-01-22 ENCOUNTER — HOSPITAL ENCOUNTER (INPATIENT)
Dept: HOSPITAL 94 - ER | Age: 65
LOS: 2 days | Discharge: HOME | DRG: 321 | End: 2025-01-24
Payer: MEDICARE

## 2025-01-22 VITALS
RESPIRATION RATE: 15 BRPM | HEART RATE: 68 BPM | SYSTOLIC BLOOD PRESSURE: 141 MMHG | DIASTOLIC BLOOD PRESSURE: 41 MMHG | OXYGEN SATURATION: 93 %

## 2025-01-22 VITALS
RESPIRATION RATE: 17 BRPM | DIASTOLIC BLOOD PRESSURE: 46 MMHG | SYSTOLIC BLOOD PRESSURE: 133 MMHG | HEART RATE: 69 BPM | OXYGEN SATURATION: 93 %

## 2025-01-22 VITALS
SYSTOLIC BLOOD PRESSURE: 126 MMHG | RESPIRATION RATE: 12 BRPM | OXYGEN SATURATION: 93 % | HEART RATE: 68 BPM | DIASTOLIC BLOOD PRESSURE: 39 MMHG

## 2025-01-22 VITALS
TEMPERATURE: 97.2 F | OXYGEN SATURATION: 93 % | HEART RATE: 76 BPM | RESPIRATION RATE: 13 BRPM | DIASTOLIC BLOOD PRESSURE: 78 MMHG | SYSTOLIC BLOOD PRESSURE: 173 MMHG

## 2025-01-22 VITALS
OXYGEN SATURATION: 93 % | SYSTOLIC BLOOD PRESSURE: 135 MMHG | DIASTOLIC BLOOD PRESSURE: 48 MMHG | RESPIRATION RATE: 18 BRPM | HEART RATE: 70 BPM

## 2025-01-22 VITALS — WEIGHT: 154.32 LBS | BODY MASS INDEX: 24.8 KG/M2 | HEIGHT: 66 IN

## 2025-01-22 VITALS
HEART RATE: 68 BPM | OXYGEN SATURATION: 93 % | DIASTOLIC BLOOD PRESSURE: 93 MMHG | RESPIRATION RATE: 19 BRPM | SYSTOLIC BLOOD PRESSURE: 144 MMHG

## 2025-01-22 DIAGNOSIS — F41.9: ICD-10-CM

## 2025-01-22 DIAGNOSIS — E11.40: ICD-10-CM

## 2025-01-22 DIAGNOSIS — E11.51: ICD-10-CM

## 2025-01-22 DIAGNOSIS — N18.6: ICD-10-CM

## 2025-01-22 DIAGNOSIS — Z99.2: ICD-10-CM

## 2025-01-22 DIAGNOSIS — N18.9: ICD-10-CM

## 2025-01-22 DIAGNOSIS — I25.10: ICD-10-CM

## 2025-01-22 DIAGNOSIS — Z79.899: ICD-10-CM

## 2025-01-22 DIAGNOSIS — E78.5: ICD-10-CM

## 2025-01-22 DIAGNOSIS — I12.0: ICD-10-CM

## 2025-01-22 DIAGNOSIS — F17.210: ICD-10-CM

## 2025-01-22 DIAGNOSIS — I21.4: Primary | ICD-10-CM

## 2025-01-22 DIAGNOSIS — E11.22: ICD-10-CM

## 2025-01-22 DIAGNOSIS — I95.9: ICD-10-CM

## 2025-01-22 DIAGNOSIS — I48.0: ICD-10-CM

## 2025-01-22 LAB
ALBUMIN SERPL BCP-MCNC: 3.1 G/DL (ref 3.4–5)
ALBUMIN/GLOB SERPL: 0.9 {RATIO} (ref 1.1–1.5)
ALP SERPL-CCNC: 103 IU/L (ref 46–116)
ALT SERPL W P-5'-P-CCNC: 36 U/L (ref 12–78)
ANION GAP SERPL CALCULATED.3IONS-SCNC: 13 MMOL/L (ref 8–16)
APTT PPP: 37 SECONDS (ref 22–32)
AST SERPL W P-5'-P-CCNC: 32 U/L (ref 10–37)
BASOPHILS # BLD AUTO: 0 X10'3 (ref 0–0.2)
BASOPHILS NFR BLD AUTO: 0.5 % (ref 0–1)
BILIRUB SERPL-MCNC: 0.5 MG/DL (ref 0.1–1)
BUN SERPL-MCNC: 15 MG/DL (ref 7–18)
BUN/CREAT SERPL: 5.5 (ref 10–20)
CALCIUM SERPL-MCNC: 9.6 MG/DL (ref 8.5–10.1)
CHLORIDE SERPL-SCNC: 103 MMOL/L (ref 99–107)
CO2 SERPL-SCNC: 24.7 MMOL/L (ref 24–32)
CREAT CL PREDICTED SERPL C-G-VRATE: 26 ML/MIN
CREAT SERPL-MCNC: 2.71 MG/DL (ref 0.6–1.1)
EOSINOPHIL # BLD AUTO: 0 X10'3 (ref 0–0.9)
EOSINOPHIL NFR BLD AUTO: 0.1 % (ref 0–6)
ERYTHROCYTE [DISTWIDTH] IN BLOOD BY AUTOMATED COUNT: 16.4 % (ref 11.5–14.5)
GFR SERPL CREATININE-BSD FRML MDRD: 24 ML/MIN
GLOBULIN SER CALC-MCNC: 3.4 G/DL (ref 2.7–4.3)
GLUCOSE SERPL-MCNC: 105 MG/DL (ref 70–104)
HCT VFR BLD AUTO: 28.7 % (ref 42–52)
HEMOCCULT STL QL: NEGATIVE
HGB BLD-MCNC: 9.5 G/DL (ref 14–17.9)
INR PPP: 1.6 INR
LYMPHOCYTES # BLD AUTO: 0.7 X10'3 (ref 1.1–4.8)
LYMPHOCYTES NFR BLD AUTO: 10.5 % (ref 21–51)
MCH RBC QN AUTO: 33 PG (ref 27–31)
MCHC RBC AUTO-ENTMCNC: 33 G/DL (ref 33–36.5)
MCV RBC AUTO: 99.8 FL (ref 78–98)
MONOCYTES # BLD AUTO: 0.8 X10'3 (ref 0–0.9)
MONOCYTES NFR BLD AUTO: 12.3 % (ref 2–12)
NEUTROPHILS # BLD AUTO: 5.1 X10'3 (ref 1.8–7.7)
NEUTROPHILS NFR BLD AUTO: 76.6 % (ref 42–75)
NT-PROBNP SERPL-MCNC: (no result) PG/ML (ref 0–125)
PLATELET # BLD AUTO: 194 X10'3 (ref 140–440)
PMV BLD AUTO: 7 FL (ref 7.4–10.4)
POTASSIUM SERPL-SCNC: 4.1 MMOL/L (ref 3.5–5.1)
PROT SERPL-MCNC: 6.5 G/DL (ref 6.4–8.2)
PROTHROMBIN TIME: 15.9 SECONDS (ref 9–12)
RBC # BLD AUTO: 2.87 X10'6 (ref 4.7–6.1)
SODIUM SERPL-SCNC: 141 MMOL/L (ref 135–145)
WBC # BLD AUTO: 6.7 X10'3 (ref 4.5–11)

## 2025-01-22 PROCEDURE — 87340 HEPATITIS B SURFACE AG IA: CPT

## 2025-01-22 PROCEDURE — 80061 LIPID PANEL: CPT

## 2025-01-22 PROCEDURE — 84484 ASSAY OF TROPONIN QUANT: CPT

## 2025-01-22 PROCEDURE — 82272 OCCULT BLD FECES 1-3 TESTS: CPT

## 2025-01-22 PROCEDURE — 83880 ASSAY OF NATRIURETIC PEPTIDE: CPT

## 2025-01-22 PROCEDURE — 85025 COMPLETE CBC W/AUTO DIFF WBC: CPT

## 2025-01-22 PROCEDURE — 36415 COLL VENOUS BLD VENIPUNCTURE: CPT

## 2025-01-22 PROCEDURE — 85347 COAGULATION TIME ACTIVATED: CPT

## 2025-01-22 PROCEDURE — 76937 US GUIDE VASCULAR ACCESS: CPT

## 2025-01-22 PROCEDURE — 87040 BLOOD CULTURE FOR BACTERIA: CPT

## 2025-01-22 PROCEDURE — 90732 PPSV23 VACC 2 YRS+ SUBQ/IM: CPT

## 2025-01-22 PROCEDURE — 85610 PROTHROMBIN TIME: CPT

## 2025-01-22 PROCEDURE — 97530 THERAPEUTIC ACTIVITIES: CPT

## 2025-01-22 PROCEDURE — 82803 BLOOD GASES ANY COMBINATION: CPT

## 2025-01-22 PROCEDURE — 71045 X-RAY EXAM CHEST 1 VIEW: CPT

## 2025-01-22 PROCEDURE — 97161 PT EVAL LOW COMPLEX 20 MIN: CPT

## 2025-01-22 PROCEDURE — 93458 L HRT ARTERY/VENTRICLE ANGIO: CPT

## 2025-01-22 PROCEDURE — 86706 HEP B SURFACE ANTIBODY: CPT

## 2025-01-22 PROCEDURE — 93005 ELECTROCARDIOGRAM TRACING: CPT

## 2025-01-22 PROCEDURE — 85730 THROMBOPLASTIN TIME PARTIAL: CPT

## 2025-01-22 PROCEDURE — 83735 ASSAY OF MAGNESIUM: CPT

## 2025-01-22 PROCEDURE — 83605 ASSAY OF LACTIC ACID: CPT

## 2025-01-22 PROCEDURE — 97116 GAIT TRAINING THERAPY: CPT

## 2025-01-22 PROCEDURE — 80053 COMPREHEN METABOLIC PANEL: CPT

## 2025-01-22 PROCEDURE — 99152 MOD SED SAME PHYS/QHP 5/>YRS: CPT

## 2025-01-22 PROCEDURE — 85014 HEMATOCRIT: CPT

## 2025-01-22 PROCEDURE — 027034Z DILATION OF CORONARY ARTERY, ONE ARTERY WITH DRUG-ELUTING INTRALUMINAL DEVICE, PERCUTANEOUS APPROACH: ICD-10-PCS | Performed by: INTERNAL MEDICINE

## 2025-01-22 PROCEDURE — 4A023N7 MEASUREMENT OF CARDIAC SAMPLING AND PRESSURE, LEFT HEART, PERCUTANEOUS APPROACH: ICD-10-PCS | Performed by: INTERNAL MEDICINE

## 2025-01-22 PROCEDURE — 93306 TTE W/DOPPLER COMPLETE: CPT

## 2025-01-22 PROCEDURE — B2111ZZ FLUOROSCOPY OF MULTIPLE CORONARY ARTERIES USING LOW OSMOLAR CONTRAST: ICD-10-PCS | Performed by: INTERNAL MEDICINE

## 2025-01-22 PROCEDURE — 87081 CULTURE SCREEN ONLY: CPT

## 2025-01-22 PROCEDURE — 99291 CRITICAL CARE FIRST HOUR: CPT

## 2025-01-22 PROCEDURE — 99153 MOD SED SAME PHYS/QHP EA: CPT

## 2025-01-22 RX ADMIN — HEPARIN SODIUM AND DEXTROSE PRN MLS/HR: 10000; 5 INJECTION INTRAVENOUS at 13:36

## 2025-01-22 RX ADMIN — DOCUSATE SODIUM SCH MG: 100 CAPSULE, LIQUID FILLED ORAL at 19:53

## 2025-01-22 RX ADMIN — Medication ONE TAB: at 13:12

## 2025-01-22 RX ADMIN — HEPARIN SODIUM ONE UNITS: 10000 INJECTION, SOLUTION INTRAVENOUS; SUBCUTANEOUS at 13:35

## 2025-01-22 RX ADMIN — Medication ONE EACH: at 13:41

## 2025-01-22 RX ADMIN — METOPROLOL SUCCINATE ONE MG: 25 TABLET, EXTENDED RELEASE ORAL at 13:12

## 2025-01-23 VITALS
HEART RATE: 67 BPM | RESPIRATION RATE: 12 BRPM | TEMPERATURE: 98.5 F | SYSTOLIC BLOOD PRESSURE: 92 MMHG | OXYGEN SATURATION: 98 % | DIASTOLIC BLOOD PRESSURE: 61 MMHG

## 2025-01-23 VITALS
SYSTOLIC BLOOD PRESSURE: 142 MMHG | HEART RATE: 72 BPM | RESPIRATION RATE: 16 BRPM | TEMPERATURE: 97.5 F | OXYGEN SATURATION: 97 % | DIASTOLIC BLOOD PRESSURE: 67 MMHG

## 2025-01-23 VITALS
OXYGEN SATURATION: 99 % | HEART RATE: 63 BPM | TEMPERATURE: 98.4 F | RESPIRATION RATE: 15 BRPM | DIASTOLIC BLOOD PRESSURE: 60 MMHG | SYSTOLIC BLOOD PRESSURE: 155 MMHG

## 2025-01-23 VITALS
RESPIRATION RATE: 20 BRPM | HEART RATE: 68 BPM | SYSTOLIC BLOOD PRESSURE: 153 MMHG | TEMPERATURE: 97.7 F | DIASTOLIC BLOOD PRESSURE: 96 MMHG | OXYGEN SATURATION: 99 %

## 2025-01-23 VITALS
HEART RATE: 71 BPM | DIASTOLIC BLOOD PRESSURE: 60 MMHG | OXYGEN SATURATION: 93 % | RESPIRATION RATE: 14 BRPM | SYSTOLIC BLOOD PRESSURE: 139 MMHG

## 2025-01-23 VITALS
OXYGEN SATURATION: 99 % | SYSTOLIC BLOOD PRESSURE: 165 MMHG | RESPIRATION RATE: 23 BRPM | DIASTOLIC BLOOD PRESSURE: 66 MMHG | TEMPERATURE: 98.6 F | HEART RATE: 65 BPM

## 2025-01-23 VITALS
HEART RATE: 71 BPM | DIASTOLIC BLOOD PRESSURE: 52 MMHG | RESPIRATION RATE: 17 BRPM | OXYGEN SATURATION: 93 % | SYSTOLIC BLOOD PRESSURE: 148 MMHG

## 2025-01-23 VITALS
RESPIRATION RATE: 16 BRPM | OXYGEN SATURATION: 93 % | HEART RATE: 76 BPM | SYSTOLIC BLOOD PRESSURE: 125 MMHG | DIASTOLIC BLOOD PRESSURE: 62 MMHG

## 2025-01-23 VITALS
DIASTOLIC BLOOD PRESSURE: 49 MMHG | OXYGEN SATURATION: 93 % | SYSTOLIC BLOOD PRESSURE: 150 MMHG | HEART RATE: 70 BPM | RESPIRATION RATE: 22 BRPM

## 2025-01-23 VITALS
HEART RATE: 70 BPM | OXYGEN SATURATION: 93 % | DIASTOLIC BLOOD PRESSURE: 67 MMHG | RESPIRATION RATE: 20 BRPM | SYSTOLIC BLOOD PRESSURE: 151 MMHG

## 2025-01-23 VITALS — OXYGEN SATURATION: 97 % | RESPIRATION RATE: 16 BRPM

## 2025-01-23 LAB
ALBUMIN SERPL BCP-MCNC: 3.3 G/DL (ref 3.4–5)
ALBUMIN/GLOB SERPL: 0.9 {RATIO} (ref 1.1–1.5)
ALP SERPL-CCNC: 104 IU/L (ref 46–116)
ALT SERPL W P-5'-P-CCNC: 40 U/L (ref 12–78)
ANION GAP SERPL CALCULATED.3IONS-SCNC: 10 MMOL/L (ref 8–16)
AST SERPL W P-5'-P-CCNC: 87 U/L (ref 10–37)
BASOPHILS # BLD AUTO: 0.1 X10'3 (ref 0–0.2)
BASOPHILS NFR BLD AUTO: 0.8 % (ref 0–1)
BILIRUB SERPL-MCNC: 0.4 MG/DL (ref 0.1–1)
BUN SERPL-MCNC: 33 MG/DL (ref 7–18)
BUN/CREAT SERPL: 6.5 (ref 10–20)
CALCIUM SERPL-MCNC: 9.7 MG/DL (ref 8.5–10.1)
CHLORIDE SERPL-SCNC: 104 MMOL/L (ref 99–107)
CHOLEST SERPL-MCNC: 121 MG/DL (ref 0–200)
CHOLEST/HDLC SERPL: 1.8 {RATIO} (ref 0–4.99)
CO2 SERPL-SCNC: 24.7 MMOL/L (ref 24–32)
CREAT CL PREDICTED SERPL C-G-VRATE: 14 ML/MIN
CREAT SERPL-MCNC: 5.07 MG/DL (ref 0.6–1.1)
EOSINOPHIL # BLD AUTO: 0 X10'3 (ref 0–0.9)
EOSINOPHIL NFR BLD AUTO: 0.2 % (ref 0–6)
ERYTHROCYTE [DISTWIDTH] IN BLOOD BY AUTOMATED COUNT: 16.6 % (ref 11.5–14.5)
GFR SERPL CREATININE-BSD FRML MDRD: 12 ML/MIN
GLOBULIN SER CALC-MCNC: 3.6 G/DL (ref 2.7–4.3)
GLUCOSE SERPL-MCNC: 95 MG/DL (ref 70–104)
HCT VFR BLD AUTO: 29.2 % (ref 42–52)
HCT VFR BLD CALC: 25 %PCV (ref 42–52)
HCT VFR BLD CALC: 25 %PCV (ref 42–52)
HDLC SERPL-MCNC: 68 MG/DL (ref 35–60)
HGB BLD CALC-MCNC: 8.5 G/DL (ref 14–17.9)
HGB BLD CALC-MCNC: 8.5 G/DL (ref 14–17.9)
HGB BLD-MCNC: 9.5 G/DL (ref 14–17.9)
INR PPP: 1.4 INR
LDLC SERPL DIRECT ASSAY-MCNC: 37 MG/DL (ref 50–100)
LYMPHOCYTES # BLD AUTO: 1.3 X10'3 (ref 1.1–4.8)
LYMPHOCYTES NFR BLD AUTO: 18.3 % (ref 21–51)
MAGNESIUM SERPL-MCNC: 2.1 MG/DL (ref 1.5–2.4)
MCH RBC QN AUTO: 32.2 PG (ref 27–31)
MCHC RBC AUTO-ENTMCNC: 32.4 G/DL (ref 33–36.5)
MCV RBC AUTO: 99.4 FL (ref 78–98)
MONOCYTES # BLD AUTO: 0.8 X10'3 (ref 0–0.9)
MONOCYTES NFR BLD AUTO: 11.7 % (ref 2–12)
NEUTROPHILS # BLD AUTO: 4.7 X10'3 (ref 1.8–7.7)
NEUTROPHILS NFR BLD AUTO: 69 % (ref 42–75)
PLATELET # BLD AUTO: 209 X10'3 (ref 140–440)
PMV BLD AUTO: 7.3 FL (ref 7.4–10.4)
POTASSIUM SERPL-SCNC: 4.9 MMOL/L (ref 3.5–5.1)
PROT SERPL-MCNC: 6.9 G/DL (ref 6.4–8.2)
PROTHROMBIN TIME: 14.6 SECONDS (ref 9–12)
RBC # BLD AUTO: 2.94 X10'6 (ref 4.7–6.1)
SAO2 % BLDA FROM PO2: 99 % (ref 95–98)
SAO2 % BLDV FROM PO2: 68 % (ref 60–80)
SODIUM SERPL-SCNC: 139 MMOL/L (ref 135–145)
SPECIMEN SOURCE: (no result)
SPECIMEN SOURCE: (no result)
TRIGL SERPL-MCNC: 115 MG/DL (ref 20–135)
WBC # BLD AUTO: 6.9 X10'3 (ref 4.5–11)

## 2025-01-23 RX ADMIN — Medication SCH TAB: at 08:26

## 2025-01-23 RX ADMIN — WARFARIN SODIUM ONE MG: 3 TABLET ORAL at 22:16

## 2025-01-23 RX ADMIN — METOPROLOL SUCCINATE SCH MG: 25 TABLET, EXTENDED RELEASE ORAL at 08:26

## 2025-01-23 RX ADMIN — PNEUMOCOCCAL VACCINE POLYVALENT ONE MCG
25; 25; 25; 25; 25; 25; 25; 25; 25; 25; 25; 25; 25; 25; 25; 25; 25; 25; 25; 25; 25; 25; 25 INJECTION, SOLUTION INTRAMUSCULAR; SUBCUTANEOUS at 08:29

## 2025-01-24 VITALS — HEART RATE: 73 BPM | SYSTOLIC BLOOD PRESSURE: 161 MMHG

## 2025-01-24 VITALS
TEMPERATURE: 97.4 F | SYSTOLIC BLOOD PRESSURE: 161 MMHG | DIASTOLIC BLOOD PRESSURE: 51 MMHG | OXYGEN SATURATION: 98 % | HEART RATE: 70 BPM | RESPIRATION RATE: 16 BRPM

## 2025-01-24 VITALS — RESPIRATION RATE: 16 BRPM | OXYGEN SATURATION: 98 %

## 2025-01-24 VITALS
SYSTOLIC BLOOD PRESSURE: 120 MMHG | TEMPERATURE: 97.7 F | HEART RATE: 68 BPM | OXYGEN SATURATION: 99 % | RESPIRATION RATE: 18 BRPM | DIASTOLIC BLOOD PRESSURE: 53 MMHG

## 2025-01-24 LAB
ALBUMIN SERPL BCP-MCNC: 3 G/DL (ref 3.4–5)
ALBUMIN/GLOB SERPL: 0.9 {RATIO} (ref 1.1–1.5)
ALP SERPL-CCNC: 99 IU/L (ref 46–116)
ALT SERPL W P-5'-P-CCNC: 32 U/L (ref 12–78)
ANION GAP SERPL CALCULATED.3IONS-SCNC: 11 MMOL/L (ref 8–16)
AST SERPL W P-5'-P-CCNC: 48 U/L (ref 10–37)
BASOPHILS # BLD AUTO: 0 X10'3 (ref 0–0.2)
BASOPHILS NFR BLD AUTO: 0.5 % (ref 0–1)
BILIRUB SERPL-MCNC: 0.4 MG/DL (ref 0.1–1)
BUN SERPL-MCNC: 53 MG/DL (ref 7–18)
BUN/CREAT SERPL: 7.3 (ref 10–20)
CALCIUM SERPL-MCNC: 9.1 MG/DL (ref 8.5–10.1)
CHLORIDE SERPL-SCNC: 102 MMOL/L (ref 99–107)
CO2 SERPL-SCNC: 23.9 MMOL/L (ref 24–32)
CREAT CL PREDICTED SERPL C-G-VRATE: 10 ML/MIN
CREAT SERPL-MCNC: 7.22 MG/DL (ref 0.6–1.1)
EOSINOPHIL # BLD AUTO: 0 X10'3 (ref 0–0.9)
EOSINOPHIL NFR BLD AUTO: 0.2 % (ref 0–6)
ERYTHROCYTE [DISTWIDTH] IN BLOOD BY AUTOMATED COUNT: 16.7 % (ref 11.5–14.5)
GFR SERPL CREATININE-BSD FRML MDRD: 8 ML/MIN
GLOBULIN SER CALC-MCNC: 3.3 G/DL (ref 2.7–4.3)
GLUCOSE SERPL-MCNC: 94 MG/DL (ref 70–104)
HCT VFR BLD AUTO: 24.7 % (ref 42–52)
HGB BLD-MCNC: 8.1 G/DL (ref 14–17.9)
INR PPP: 1.2 INR
LYMPHOCYTES # BLD AUTO: 1.1 X10'3 (ref 1.1–4.8)
LYMPHOCYTES NFR BLD AUTO: 14.4 % (ref 21–51)
MAGNESIUM SERPL-MCNC: 2.1 MG/DL (ref 1.5–2.4)
MCH RBC QN AUTO: 32.6 PG (ref 27–31)
MCHC RBC AUTO-ENTMCNC: 32.9 G/DL (ref 33–36.5)
MCV RBC AUTO: 99.2 FL (ref 78–98)
MONOCYTES # BLD AUTO: 1 X10'3 (ref 0–0.9)
MONOCYTES NFR BLD AUTO: 12.5 % (ref 2–12)
NEUTROPHILS # BLD AUTO: 5.7 X10'3 (ref 1.8–7.7)
NEUTROPHILS NFR BLD AUTO: 72.4 % (ref 42–75)
PLATELET # BLD AUTO: 204 X10'3 (ref 140–440)
PMV BLD AUTO: 7.4 FL (ref 7.4–10.4)
POTASSIUM SERPL-SCNC: 5.1 MMOL/L (ref 3.5–5.1)
PROT SERPL-MCNC: 6.3 G/DL (ref 6.4–8.2)
PROTHROMBIN TIME: 12.7 SECONDS (ref 9–12)
RBC # BLD AUTO: 2.49 X10'6 (ref 4.7–6.1)
SODIUM SERPL-SCNC: 137 MMOL/L (ref 135–145)
WBC # BLD AUTO: 7.8 X10'3 (ref 4.5–11)

## 2025-01-24 RX ADMIN — ONDANSETRON PRN MG: 2 INJECTION, SOLUTION INTRAMUSCULAR; INTRAVENOUS at 01:08

## 2025-05-31 ENCOUNTER — HOSPITAL ENCOUNTER (INPATIENT)
Dept: HOSPITAL 94 - ER | Age: 65
LOS: 2 days | Discharge: HOME HEALTH | DRG: 308 | End: 2025-06-02
Attending: GENERAL PRACTICE | Admitting: GENERAL PRACTICE
Payer: MEDICARE

## 2025-05-31 VITALS
DIASTOLIC BLOOD PRESSURE: 96 MMHG | RESPIRATION RATE: 23 BRPM | SYSTOLIC BLOOD PRESSURE: 165 MMHG | OXYGEN SATURATION: 94 % | HEART RATE: 61 BPM | TEMPERATURE: 98.9 F

## 2025-05-31 VITALS
TEMPERATURE: 97.2 F | DIASTOLIC BLOOD PRESSURE: 91 MMHG | HEART RATE: 68 BPM | RESPIRATION RATE: 15 BRPM | OXYGEN SATURATION: 98 % | SYSTOLIC BLOOD PRESSURE: 166 MMHG

## 2025-05-31 VITALS
OXYGEN SATURATION: 96 % | RESPIRATION RATE: 14 BRPM | DIASTOLIC BLOOD PRESSURE: 88 MMHG | TEMPERATURE: 97.8 F | HEART RATE: 77 BPM | SYSTOLIC BLOOD PRESSURE: 178 MMHG

## 2025-05-31 VITALS
HEART RATE: 73 BPM | RESPIRATION RATE: 14 BRPM | SYSTOLIC BLOOD PRESSURE: 198 MMHG | TEMPERATURE: 97.5 F | OXYGEN SATURATION: 97 % | DIASTOLIC BLOOD PRESSURE: 81 MMHG

## 2025-05-31 VITALS — HEIGHT: 66 IN | WEIGHT: 150.31 LBS | BODY MASS INDEX: 24.16 KG/M2

## 2025-05-31 VITALS — OXYGEN SATURATION: 98 % | RESPIRATION RATE: 15 BRPM

## 2025-05-31 DIAGNOSIS — D63.1: ICD-10-CM

## 2025-05-31 DIAGNOSIS — Z80.3: ICD-10-CM

## 2025-05-31 DIAGNOSIS — E11.22: ICD-10-CM

## 2025-05-31 DIAGNOSIS — I48.91: Primary | ICD-10-CM

## 2025-05-31 DIAGNOSIS — I25.10: ICD-10-CM

## 2025-05-31 DIAGNOSIS — E11.51: ICD-10-CM

## 2025-05-31 DIAGNOSIS — Z82.3: ICD-10-CM

## 2025-05-31 DIAGNOSIS — Z86.73: ICD-10-CM

## 2025-05-31 DIAGNOSIS — Z79.01: ICD-10-CM

## 2025-05-31 DIAGNOSIS — Z79.82: ICD-10-CM

## 2025-05-31 DIAGNOSIS — D68.69: ICD-10-CM

## 2025-05-31 DIAGNOSIS — E87.6: ICD-10-CM

## 2025-05-31 DIAGNOSIS — E83.39: ICD-10-CM

## 2025-05-31 DIAGNOSIS — Z83.3: ICD-10-CM

## 2025-05-31 DIAGNOSIS — E11.43: ICD-10-CM

## 2025-05-31 DIAGNOSIS — I12.0: ICD-10-CM

## 2025-05-31 DIAGNOSIS — I95.9: ICD-10-CM

## 2025-05-31 DIAGNOSIS — E03.9: ICD-10-CM

## 2025-05-31 DIAGNOSIS — Z99.2: ICD-10-CM

## 2025-05-31 DIAGNOSIS — I47.10: ICD-10-CM

## 2025-05-31 DIAGNOSIS — Z79.84: ICD-10-CM

## 2025-05-31 DIAGNOSIS — Z82.49: ICD-10-CM

## 2025-05-31 DIAGNOSIS — E78.5: ICD-10-CM

## 2025-05-31 DIAGNOSIS — Z79.02: ICD-10-CM

## 2025-05-31 DIAGNOSIS — N18.6: ICD-10-CM

## 2025-05-31 DIAGNOSIS — E87.5: ICD-10-CM

## 2025-05-31 DIAGNOSIS — K31.84: ICD-10-CM

## 2025-05-31 LAB
ACANTHOCYTES BLD QL SMEAR: (no result)
AGRAN PLATELETS BLD QL SMEAR: (no result)
ALBUMIN SERPL BCP-MCNC: 3.1 G/DL (ref 3.4–5)
ALBUMIN/GLOB SERPL: 1 {RATIO} (ref 1.1–1.5)
ALP SERPL-CCNC: 99 IU/L (ref 46–116)
ALT SERPL W P-5'-P-CCNC: 16 U/L (ref 12–78)
ANION GAP SERPL CALCULATED.3IONS-SCNC: 15 MMOL/L (ref 8–16)
ANISOCYTOSIS BLD QL SMEAR: (no result)
APTT PPP: 36 SECONDS (ref 22–32)
AST SERPL W P-5'-P-CCNC: 18 U/L (ref 10–37)
BASO STIPL BLD QL SMEAR: (no result)
BASOPHILS # BLD AUTO: 0 X10'3 (ref 0–0.2)
BASOPHILS NFR BLD AUTO: 0.6 % (ref 0–1)
BILIRUB SERPL-MCNC: 0.5 MG/DL (ref 0.1–1)
BSA: (no result) M2
BUN SERPL-MCNC: 7 MG/DL (ref 7–18)
BUN/CREAT SERPL: 2.4 (ref 10–20)
BURR CELLS BLD QL SMEAR: (no result)
CALCIUM SERPL-MCNC: 8.9 MG/DL (ref 8.5–10.1)
CHLORIDE SERPL-SCNC: 103 MMOL/L (ref 99–107)
CO2 SERPL-SCNC: 21.3 MMOL/L (ref 24–32)
CREAT CL PREDICTED SERPL C-G-VRATE: 24 ML/MIN
CREAT SERPL-MCNC: 2.92 MG/DL (ref 0.6–1.1)
DACRYOCYTES BLD QL SMEAR: (no result)
EOSINOPHIL # BLD AUTO: 0 X10'3 (ref 0–0.9)
EOSINOPHIL NFR BLD AUTO: 0.3 % (ref 0–6)
ERYTHROCYTE [DISTWIDTH] IN BLOOD BY AUTOMATED COUNT: 20.3 % (ref 11.5–14.5)
GFR SERPL CREATININE-BSD FRML MDRD: 22 ML/MIN
GIANT PLATELETS BLD QL SMEAR: (no result)
GLOBULIN SER CALC-MCNC: 3.2 G/DL (ref 2.7–4.3)
GLUCOSE SERPL-MCNC: 104 MG/DL (ref 70–104)
HCT VFR BLD AUTO: 37.3 % (ref 42–52)
HGB BLD-MCNC: 12.1 G/DL (ref 14–17.9)
HOWELL-JOLLY BOD BLD QL SMEAR: (no result)
HYPOCHROMIA BLD QL SMEAR: (no result)
INR PPP: 1.8 INR
LG PLATELETS BLD QL SMEAR: (no result)
LYMPHOCYTES # BLD AUTO: 1.2 X10'3 (ref 1.1–4.8)
LYMPHOCYTES NFR BLD AUTO: 20 % (ref 21–51)
MACROCYTES BLD QL SMEAR: (no result)
MAGNESIUM SERPL-MCNC: 1.8 MG/DL (ref 1.5–2.4)
MCH RBC QN AUTO: 29.8 PG (ref 27–31)
MCHC RBC AUTO-ENTMCNC: 32.6 G/DL (ref 33–36.5)
MCV RBC AUTO: 91.3 FL (ref 78–98)
MICROCYTES BLD QL SMEAR: (no result)
MICROMEGAKARYOCYTES [#/VOLUME] IN BLOOD BY MANUAL COUNT: (no result) 10*3/UL
MONOCYTES # BLD AUTO: 0.9 X10'3 (ref 0–0.9)
MONOCYTES NFR BLD AUTO: 13.9 % (ref 2–12)
NEUTROPHILS # BLD AUTO: 4 X10'3 (ref 1.8–7.7)
NEUTROPHILS NFR BLD AUTO: 65.2 % (ref 42–75)
NT-PROBNP SERPL-MCNC: (no result) PG/ML (ref 0–125)
OVALOCYTES BLD QL SMEAR: (no result)
PLATELET # BLD AUTO: 217 X10'3 (ref 140–440)
PLATELET BLD QL SMEAR: NORMAL
PMV BLD AUTO: 7.3 FL (ref 7.4–10.4)
POIKILOCYTOSIS BLD QL SMEAR: (no result)
POLYCHROMASIA BLD QL SMEAR: (no result)
POTASSIUM SERPL-SCNC: 3.4 MMOL/L (ref 3.5–5.1)
PROT SERPL-MCNC: 6.3 G/DL (ref 6.4–8.2)
PROTHROMBIN TIME: 17.2 SECONDS (ref 9–12)
RBC # BLD AUTO: 4.08 X10'6 (ref 4.7–6.1)
RBC MORPH BLD: (no result)
ROULEAUX BLD QL SMEAR: (no result)
SCHISTOCYTES BLD QL SMEAR: (no result)
SICKLE CELLS BLD QL SMEAR: (no result)
SODIUM SERPL-SCNC: 139 MMOL/L (ref 135–145)
SPHEROCYTES BLD QL SMEAR: (no result)
STOMATOCYTES BLD QL SMEAR: (no result)
TARGETS BLD QL SMEAR: (no result)
WBC # BLD AUTO: 6.1 X10'3 (ref 4.5–11)

## 2025-05-31 PROCEDURE — 87340 HEPATITIS B SURFACE AG IA: CPT

## 2025-05-31 PROCEDURE — 85025 COMPLETE CBC W/AUTO DIFF WBC: CPT

## 2025-05-31 PROCEDURE — 84484 ASSAY OF TROPONIN QUANT: CPT

## 2025-05-31 PROCEDURE — 97162 PT EVAL MOD COMPLEX 30 MIN: CPT

## 2025-05-31 PROCEDURE — 97116 GAIT TRAINING THERAPY: CPT

## 2025-05-31 PROCEDURE — 83880 ASSAY OF NATRIURETIC PEPTIDE: CPT

## 2025-05-31 PROCEDURE — 99285 EMERGENCY DEPT VISIT HI MDM: CPT

## 2025-05-31 PROCEDURE — 93005 ELECTROCARDIOGRAM TRACING: CPT

## 2025-05-31 PROCEDURE — 85008 BL SMEAR W/O DIFF WBC COUNT: CPT

## 2025-05-31 PROCEDURE — 84100 ASSAY OF PHOSPHORUS: CPT

## 2025-05-31 PROCEDURE — 85610 PROTHROMBIN TIME: CPT

## 2025-05-31 PROCEDURE — 83735 ASSAY OF MAGNESIUM: CPT

## 2025-05-31 PROCEDURE — 96374 THER/PROPH/DIAG INJ IV PUSH: CPT

## 2025-05-31 PROCEDURE — 87081 CULTURE SCREEN ONLY: CPT

## 2025-05-31 PROCEDURE — 36415 COLL VENOUS BLD VENIPUNCTURE: CPT

## 2025-05-31 PROCEDURE — 80053 COMPREHEN METABOLIC PANEL: CPT

## 2025-05-31 PROCEDURE — 71045 X-RAY EXAM CHEST 1 VIEW: CPT

## 2025-05-31 PROCEDURE — 85730 THROMBOPLASTIN TIME PARTIAL: CPT

## 2025-05-31 PROCEDURE — 84443 ASSAY THYROID STIM HORMONE: CPT

## 2025-05-31 RX ADMIN — AMLODIPINE BESYLATE SCH MG: 5 TABLET ORAL at 20:19

## 2025-05-31 RX ADMIN — ASPIRIN 81 MG CHEWABLE TABLET ONE MG: 81 TABLET CHEWABLE at 14:02

## 2025-05-31 RX ADMIN — ONDANSETRON PRN MG: 2 INJECTION, SOLUTION INTRAMUSCULAR; INTRAVENOUS at 20:22

## 2025-05-31 RX ADMIN — ATORVASTATIN CALCIUM SCH MG: 20 TABLET, FILM COATED ORAL at 20:18

## 2025-05-31 RX ADMIN — DOCUSATE SODIUM SCH MG: 100 CAPSULE, LIQUID FILLED ORAL at 20:00

## 2025-05-31 RX ADMIN — METOPROLOL TARTRATE SCH MG: 5 INJECTION INTRAVENOUS at 12:18

## 2025-05-31 RX ADMIN — ASPIRIN 81 MG CHEWABLE TABLET ONE MG: 81 TABLET CHEWABLE at 12:18

## 2025-05-31 RX ADMIN — WARFARIN SODIUM ONE MG: 4 TABLET ORAL at 21:00

## 2025-06-01 VITALS
TEMPERATURE: 97.5 F | DIASTOLIC BLOOD PRESSURE: 69 MMHG | HEART RATE: 64 BPM | SYSTOLIC BLOOD PRESSURE: 167 MMHG | RESPIRATION RATE: 23 BRPM | OXYGEN SATURATION: 97 %

## 2025-06-01 VITALS
TEMPERATURE: 99.3 F | OXYGEN SATURATION: 100 % | RESPIRATION RATE: 15 BRPM | HEART RATE: 87 BPM | DIASTOLIC BLOOD PRESSURE: 66 MMHG | SYSTOLIC BLOOD PRESSURE: 130 MMHG

## 2025-06-01 VITALS — OXYGEN SATURATION: 100 % | RESPIRATION RATE: 15 BRPM

## 2025-06-01 VITALS
OXYGEN SATURATION: 98 % | DIASTOLIC BLOOD PRESSURE: 73 MMHG | RESPIRATION RATE: 16 BRPM | SYSTOLIC BLOOD PRESSURE: 163 MMHG | TEMPERATURE: 96.9 F | HEART RATE: 92 BPM

## 2025-06-01 VITALS
SYSTOLIC BLOOD PRESSURE: 203 MMHG | OXYGEN SATURATION: 99 % | HEART RATE: 72 BPM | RESPIRATION RATE: 12 BRPM | DIASTOLIC BLOOD PRESSURE: 85 MMHG | TEMPERATURE: 97.1 F

## 2025-06-01 VITALS
RESPIRATION RATE: 14 BRPM | HEART RATE: 71 BPM | SYSTOLIC BLOOD PRESSURE: 156 MMHG | DIASTOLIC BLOOD PRESSURE: 74 MMHG | TEMPERATURE: 97.2 F | OXYGEN SATURATION: 99 %

## 2025-06-01 VITALS — RESPIRATION RATE: 14 BRPM | OXYGEN SATURATION: 99 %

## 2025-06-01 LAB
ALBUMIN SERPL BCP-MCNC: 3.4 G/DL (ref 3.4–5)
ALBUMIN/GLOB SERPL: 1 {RATIO} (ref 1.1–1.5)
ALP SERPL-CCNC: 102 IU/L (ref 46–116)
ALT SERPL W P-5'-P-CCNC: 19 U/L (ref 12–78)
ANION GAP SERPL CALCULATED.3IONS-SCNC: 14 MMOL/L (ref 8–16)
AST SERPL W P-5'-P-CCNC: 30 U/L (ref 10–37)
BASOPHILS # BLD AUTO: 0.1 X10'3 (ref 0–0.2)
BASOPHILS NFR BLD AUTO: 0.6 % (ref 0–1)
BILIRUB SERPL-MCNC: 0.6 MG/DL (ref 0.1–1)
BSA: (no result) M2
BUN SERPL-MCNC: 16 MG/DL (ref 7–18)
BUN/CREAT SERPL: 2.7 (ref 10–20)
CALCIUM SERPL-MCNC: 9.1 MG/DL (ref 8.5–10.1)
CHLORIDE SERPL-SCNC: 103 MMOL/L (ref 99–107)
CO2 SERPL-SCNC: 23.2 MMOL/L (ref 24–32)
CREAT CL PREDICTED SERPL C-G-VRATE: 12 ML/MIN
CREAT SERPL-MCNC: 5.91 MG/DL (ref 0.6–1.1)
EOSINOPHIL # BLD AUTO: 0 X10'3 (ref 0–0.9)
EOSINOPHIL NFR BLD AUTO: 0.1 % (ref 0–6)
ERYTHROCYTE [DISTWIDTH] IN BLOOD BY AUTOMATED COUNT: 20.7 % (ref 11.5–14.5)
GFR SERPL CREATININE-BSD FRML MDRD: 10 ML/MIN
GLOBULIN SER CALC-MCNC: 3.3 G/DL (ref 2.7–4.3)
GLUCOSE SERPL-MCNC: 134 MG/DL (ref 70–104)
HCT VFR BLD AUTO: 39.9 % (ref 42–52)
HGB BLD-MCNC: 12.6 G/DL (ref 14–17.9)
INR PPP: 1.6 INR
LYMPHOCYTES # BLD AUTO: 0.5 X10'3 (ref 1.1–4.8)
LYMPHOCYTES NFR BLD AUTO: 4.6 % (ref 21–51)
MCH RBC QN AUTO: 29.6 PG (ref 27–31)
MCHC RBC AUTO-ENTMCNC: 31.6 G/DL (ref 33–36.5)
MCV RBC AUTO: 93.6 FL (ref 78–98)
MONOCYTES # BLD AUTO: 1 X10'3 (ref 0–0.9)
MONOCYTES NFR BLD AUTO: 8.6 % (ref 2–12)
NEUTROPHILS # BLD AUTO: 9.6 X10'3 (ref 1.8–7.7)
NEUTROPHILS NFR BLD AUTO: 86.1 % (ref 42–75)
PHOSPHATE SERPL-MCNC: 3.4 MG/DL (ref 2.3–4.5)
PLATELET # BLD AUTO: 257 X10'3 (ref 140–440)
PMV BLD AUTO: 7.1 FL (ref 7.4–10.4)
POTASSIUM SERPL-SCNC: 3.7 MMOL/L (ref 3.5–5.1)
PROT SERPL-MCNC: 6.7 G/DL (ref 6.4–8.2)
PROTHROMBIN TIME: 16.1 SECONDS (ref 9–12)
RBC # BLD AUTO: 4.26 X10'6 (ref 4.7–6.1)
SODIUM SERPL-SCNC: 140 MMOL/L (ref 135–145)
WBC # BLD AUTO: 11.2 X10'3 (ref 4.5–11)

## 2025-06-01 RX ADMIN — LISINOPRIL SCH MG: 20 TABLET ORAL at 08:09

## 2025-06-01 RX ADMIN — WARFARIN SODIUM ONE MG: 4 TABLET ORAL at 21:24

## 2025-06-01 RX ADMIN — LEVOTHYROXINE SODIUM SCH MCG: 125 TABLET ORAL at 08:08

## 2025-06-01 RX ADMIN — CLOPIDOGREL BISULFATE SCH MG: 75 TABLET ORAL at 08:09

## 2025-06-01 RX ADMIN — HYDRALAZINE HYDROCHLORIDE SCH MG: 25 TABLET, FILM COATED ORAL at 08:08

## 2025-06-01 RX ADMIN — HYDRALAZINE HYDROCHLORIDE PRN MG: 20 INJECTION INTRAMUSCULAR; INTRAVENOUS at 11:29

## 2025-06-01 RX ADMIN — ALUMINUM HYDROXIDE, MAGNESIUM HYDROXIDE, AND SIMETHICONE PRN ML: 200; 200; 20 SUSPENSION ORAL at 11:29

## 2025-06-01 RX ADMIN — Medication SCH TAB: at 08:08

## 2025-06-01 RX ADMIN — METOPROLOL SUCCINATE SCH MG: 25 TABLET, EXTENDED RELEASE ORAL at 08:09

## 2025-06-02 VITALS
TEMPERATURE: 97.6 F | DIASTOLIC BLOOD PRESSURE: 47 MMHG | SYSTOLIC BLOOD PRESSURE: 117 MMHG | HEART RATE: 65 BPM | OXYGEN SATURATION: 98 % | RESPIRATION RATE: 14 BRPM

## 2025-06-02 VITALS — SYSTOLIC BLOOD PRESSURE: 167 MMHG | DIASTOLIC BLOOD PRESSURE: 73 MMHG

## 2025-06-02 VITALS
OXYGEN SATURATION: 99 % | SYSTOLIC BLOOD PRESSURE: 134 MMHG | DIASTOLIC BLOOD PRESSURE: 71 MMHG | RESPIRATION RATE: 16 BRPM | HEART RATE: 65 BPM

## 2025-06-02 VITALS
RESPIRATION RATE: 16 BRPM | TEMPERATURE: 98.1 F | SYSTOLIC BLOOD PRESSURE: 109 MMHG | HEART RATE: 67 BPM | OXYGEN SATURATION: 100 % | DIASTOLIC BLOOD PRESSURE: 52 MMHG

## 2025-06-02 VITALS
RESPIRATION RATE: 10 BRPM | SYSTOLIC BLOOD PRESSURE: 144 MMHG | HEART RATE: 64 BPM | OXYGEN SATURATION: 97 % | DIASTOLIC BLOOD PRESSURE: 74 MMHG | TEMPERATURE: 98.2 F

## 2025-06-02 VITALS
HEART RATE: 80 BPM | SYSTOLIC BLOOD PRESSURE: 120 MMHG | RESPIRATION RATE: 16 BRPM | DIASTOLIC BLOOD PRESSURE: 63 MMHG | OXYGEN SATURATION: 97 %

## 2025-06-02 VITALS — DIASTOLIC BLOOD PRESSURE: 77 MMHG | HEART RATE: 72 BPM | SYSTOLIC BLOOD PRESSURE: 191 MMHG

## 2025-06-02 VITALS — RESPIRATION RATE: 14 BRPM | OXYGEN SATURATION: 98 %

## 2025-06-02 VITALS
TEMPERATURE: 98.1 F | DIASTOLIC BLOOD PRESSURE: 74 MMHG | HEART RATE: 69 BPM | RESPIRATION RATE: 14 BRPM | OXYGEN SATURATION: 98 % | SYSTOLIC BLOOD PRESSURE: 172 MMHG

## 2025-06-02 VITALS
DIASTOLIC BLOOD PRESSURE: 74 MMHG | OXYGEN SATURATION: 98 % | SYSTOLIC BLOOD PRESSURE: 163 MMHG | RESPIRATION RATE: 18 BRPM | HEART RATE: 69 BPM

## 2025-06-02 VITALS — DIASTOLIC BLOOD PRESSURE: 65 MMHG | SYSTOLIC BLOOD PRESSURE: 113 MMHG

## 2025-06-02 VITALS
HEART RATE: 68 BPM | RESPIRATION RATE: 16 BRPM | OXYGEN SATURATION: 97 % | DIASTOLIC BLOOD PRESSURE: 67 MMHG | SYSTOLIC BLOOD PRESSURE: 149 MMHG

## 2025-06-02 VITALS
OXYGEN SATURATION: 98 % | HEART RATE: 64 BPM | DIASTOLIC BLOOD PRESSURE: 62 MMHG | RESPIRATION RATE: 16 BRPM | SYSTOLIC BLOOD PRESSURE: 131 MMHG

## 2025-06-02 VITALS
SYSTOLIC BLOOD PRESSURE: 108 MMHG | DIASTOLIC BLOOD PRESSURE: 51 MMHG | HEART RATE: 70 BPM | RESPIRATION RATE: 18 BRPM | OXYGEN SATURATION: 99 %

## 2025-06-02 VITALS
DIASTOLIC BLOOD PRESSURE: 76 MMHG | SYSTOLIC BLOOD PRESSURE: 154 MMHG | RESPIRATION RATE: 18 BRPM | TEMPERATURE: 97.1 F | HEART RATE: 75 BPM | OXYGEN SATURATION: 95 %

## 2025-06-02 VITALS — HEART RATE: 72 BPM | DIASTOLIC BLOOD PRESSURE: 83 MMHG | SYSTOLIC BLOOD PRESSURE: 203 MMHG

## 2025-06-02 LAB
ALBUMIN SERPL BCP-MCNC: 3.2 G/DL (ref 3.4–5)
ALBUMIN/GLOB SERPL: 1 {RATIO} (ref 1.1–1.5)
ALP SERPL-CCNC: 100 IU/L (ref 46–116)
ALT SERPL W P-5'-P-CCNC: 20 U/L (ref 12–78)
ANION GAP SERPL CALCULATED.3IONS-SCNC: 15 MMOL/L (ref 8–16)
AST SERPL W P-5'-P-CCNC: 30 U/L (ref 10–37)
BASOPHILS # BLD AUTO: 0 X10'3 (ref 0–0.2)
BASOPHILS NFR BLD AUTO: 0.6 % (ref 0–1)
BILIRUB SERPL-MCNC: 0.6 MG/DL (ref 0.1–1)
BSA: (no result) M2
BUN SERPL-MCNC: 19 MG/DL (ref 7–18)
BUN/CREAT SERPL: 2.9 (ref 10–20)
CALCIUM SERPL-MCNC: 8.8 MG/DL (ref 8.5–10.1)
CHLORIDE SERPL-SCNC: 101 MMOL/L (ref 99–107)
CO2 SERPL-SCNC: 24.1 MMOL/L (ref 24–32)
CREAT CL PREDICTED SERPL C-G-VRATE: 10 ML/MIN
CREAT SERPL-MCNC: 6.61 MG/DL (ref 0.6–1.1)
EOSINOPHIL # BLD AUTO: 0 X10'3 (ref 0–0.9)
EOSINOPHIL NFR BLD AUTO: 0.2 % (ref 0–6)
ERYTHROCYTE [DISTWIDTH] IN BLOOD BY AUTOMATED COUNT: 20.5 % (ref 11.5–14.5)
GFR SERPL CREATININE-BSD FRML MDRD: 9 ML/MIN
GLOBULIN SER CALC-MCNC: 3.3 G/DL (ref 2.7–4.3)
GLUCOSE SERPL-MCNC: 156 MG/DL (ref 70–104)
HBV SURFACE AG SERPL QL NT: (no result)
HCT VFR BLD AUTO: 39.6 % (ref 42–52)
HGB BLD-MCNC: 12.6 G/DL (ref 14–17.9)
INR PPP: 2.5 INR
LYMPHOCYTES # BLD AUTO: 0.6 X10'3 (ref 1.1–4.8)
LYMPHOCYTES NFR BLD AUTO: 9.9 % (ref 21–51)
MCH RBC QN AUTO: 29.7 PG (ref 27–31)
MCHC RBC AUTO-ENTMCNC: 31.9 G/DL (ref 33–36.5)
MCV RBC AUTO: 93 FL (ref 78–98)
MONOCYTES # BLD AUTO: 0.3 X10'3 (ref 0–0.9)
MONOCYTES NFR BLD AUTO: 4.3 % (ref 2–12)
NEUTROPHILS # BLD AUTO: 5.2 X10'3 (ref 1.8–7.7)
NEUTROPHILS NFR BLD AUTO: 85 % (ref 42–75)
PLATELET # BLD AUTO: 248 X10'3 (ref 140–440)
PMV BLD AUTO: 7.4 FL (ref 7.4–10.4)
POTASSIUM SERPL-SCNC: 3.3 MMOL/L (ref 3.5–5.1)
PROT SERPL-MCNC: 6.5 G/DL (ref 6.4–8.2)
PROTHROMBIN TIME: 23.7 SECONDS (ref 9–12)
RBC # BLD AUTO: 4.26 X10'6 (ref 4.7–6.1)
SODIUM SERPL-SCNC: 140 MMOL/L (ref 135–145)
TSH SERPL DL<=0.05 MIU/L-ACNC: 3.01 ULU/ML (ref 0.34–4.5)
WBC # BLD AUTO: 6.1 X10'3 (ref 4.5–11)

## 2025-06-02 PROCEDURE — 5A1D70Z PERFORMANCE OF URINARY FILTRATION, INTERMITTENT, LESS THAN 6 HOURS PER DAY: ICD-10-PCS | Performed by: GENERAL PRACTICE

## 2025-06-02 RX ADMIN — METOPROLOL TARTRATE ONE MG: 5 INJECTION INTRAVENOUS at 05:50

## 2025-06-02 RX ADMIN — HYDRALAZINE HYDROCHLORIDE PRN MG: 20 INJECTION INTRAMUSCULAR; INTRAVENOUS at 04:24

## 2025-06-02 RX ADMIN — LABETALOL HYDROCHLORIDE ONE MG: 5 INJECTION, SOLUTION INTRAVENOUS at 01:36

## 2025-06-03 LAB — HBV SURFACE AG SERPL QL IA: NEGATIVE
